# Patient Record
Sex: FEMALE | Race: WHITE | Employment: FULL TIME | URBAN - METROPOLITAN AREA
[De-identification: names, ages, dates, MRNs, and addresses within clinical notes are randomized per-mention and may not be internally consistent; named-entity substitution may affect disease eponyms.]

---

## 2017-05-20 ENCOUNTER — HOSPITAL ENCOUNTER (EMERGENCY)
Age: 37
Discharge: HOME | End: 2017-05-20
Attending: INTERNAL MEDICINE | Admitting: INTERNAL MEDICINE
Payer: COMMERCIAL

## 2017-05-20 ENCOUNTER — APPOINTMENT (OUTPATIENT)
Dept: GENERAL RADIOLOGY | Age: 37
End: 2017-05-20
Attending: INTERNAL MEDICINE
Payer: COMMERCIAL

## 2017-05-20 VITALS
DIASTOLIC BLOOD PRESSURE: 70 MMHG | TEMPERATURE: 98 F | SYSTOLIC BLOOD PRESSURE: 118 MMHG | OXYGEN SATURATION: 98 % | BODY MASS INDEX: 41.98 KG/M2 | HEIGHT: 68 IN | HEART RATE: 70 BPM | RESPIRATION RATE: 16 BRPM | WEIGHT: 277 LBS

## 2017-05-20 DIAGNOSIS — N30.00 ACUTE CYSTITIS WITHOUT HEMATURIA: ICD-10-CM

## 2017-05-20 DIAGNOSIS — M54.50 BILATERAL LOW BACK PAIN WITHOUT SCIATICA, UNSPECIFIED CHRONICITY: Primary | ICD-10-CM

## 2017-05-20 LAB
APPEARANCE UR: ABNORMAL
BACTERIA URNS QL MICRO: ABNORMAL /HPF
BILIRUB UR QL: NEGATIVE
COLOR UR: ABNORMAL
EPITH CASTS URNS QL MICRO: ABNORMAL /LPF (ref 0–5)
GLUCOSE UR STRIP.AUTO-MCNC: NEGATIVE MG/DL
HCG UR QL: NEGATIVE
HGB UR QL STRIP: NEGATIVE
KETONES UR QL STRIP.AUTO: ABNORMAL MG/DL
LEUKOCYTE ESTERASE UR QL STRIP.AUTO: ABNORMAL
NITRITE UR QL STRIP.AUTO: POSITIVE
PH UR STRIP: 5.5 [PH] (ref 5–8)
PROT UR STRIP-MCNC: NEGATIVE MG/DL
RBC #/AREA URNS HPF: ABNORMAL /HPF (ref 0–5)
SP GR UR REFRACTOMETRY: 1.02 (ref 1–1.03)
UROBILINOGEN UR QL STRIP.AUTO: 1 EU/DL (ref 0.2–1)
WBC URNS QL MICRO: ABNORMAL /HPF (ref 0–5)

## 2017-05-20 PROCEDURE — 87077 CULTURE AEROBIC IDENTIFY: CPT | Performed by: INTERNAL MEDICINE

## 2017-05-20 PROCEDURE — 87086 URINE CULTURE/COLONY COUNT: CPT | Performed by: INTERNAL MEDICINE

## 2017-05-20 PROCEDURE — 99284 EMERGENCY DEPT VISIT MOD MDM: CPT

## 2017-05-20 PROCEDURE — 72110 X-RAY EXAM L-2 SPINE 4/>VWS: CPT

## 2017-05-20 PROCEDURE — 74011250636 HC RX REV CODE- 250/636: Performed by: INTERNAL MEDICINE

## 2017-05-20 PROCEDURE — 96372 THER/PROPH/DIAG INJ SC/IM: CPT

## 2017-05-20 PROCEDURE — 87186 SC STD MICRODIL/AGAR DIL: CPT | Performed by: INTERNAL MEDICINE

## 2017-05-20 PROCEDURE — 81025 URINE PREGNANCY TEST: CPT

## 2017-05-20 PROCEDURE — 81001 URINALYSIS AUTO W/SCOPE: CPT | Performed by: INTERNAL MEDICINE

## 2017-05-20 RX ORDER — KETOROLAC TROMETHAMINE 30 MG/ML
30 INJECTION, SOLUTION INTRAMUSCULAR; INTRAVENOUS
Status: COMPLETED | OUTPATIENT
Start: 2017-05-20 | End: 2017-05-20

## 2017-05-20 RX ORDER — CEPHALEXIN 500 MG/1
500 CAPSULE ORAL 3 TIMES DAILY
Qty: 21 CAP | Refills: 0 | Status: SHIPPED | OUTPATIENT
Start: 2017-05-20 | End: 2017-05-27

## 2017-05-20 RX ORDER — ACETAMINOPHEN AND CODEINE PHOSPHATE 300; 30 MG/1; MG/1
1 TABLET ORAL
Qty: 12 TAB | Refills: 0 | Status: SHIPPED | OUTPATIENT
Start: 2017-05-20 | End: 2020-03-28

## 2017-05-20 RX ADMIN — KETOROLAC TROMETHAMINE 30 MG: 30 INJECTION, SOLUTION INTRAMUSCULAR at 12:35

## 2017-05-20 NOTE — ED PROVIDER NOTES
[address]  EMERGENCY DEPARTMENT HISTORY AND PHYSICAL EXAM       Date: 5/20/2017   Patient Name: Tanvir Aguilar   YOB: 1980  Medical Record Number: [id number]    History of Presenting Illness     Chief Complaint   Patient presents with    Back Pain        History Provided By:  patient    Additional History: 12:11 PM  Dada Crespo is a 42 y.o. female who presents to the emergency department from work C/O an intermittent sharp and pressure-like, lower back pain (8/10) x 2 weeks, which started while the pt was lifting a pan of chicken at work. The pain is worse in the middle of her back and radiates bilaterally to both buttocks. [facility]10/2017 and was rx'd muscle relaxers, tylenol #3, and ibuprofen. She returned to work on 5/15 and was still in significant pain. She was then sent to [facility], and had the muscle relaxer refilled, was rx'd prednisone, and was told to discontinue tylenol #3. Reports recent weight loss due to reduced PO. LNMP was 3 weeks ago. Pt denies fever, chills, bowel or bladder incontinence, dysuria, foul odor to urine other injury or fall, abdominal pain, CP, any other sxs or complaints at this time. Primary Care Provider: None   Specialist:    Past History     Past Medical History:   History reviewed. No pertinent past medical history. Past Surgical History:   History reviewed. No pertinent surgical history. Family History:   History reviewed. No pertinent family history. Social History:   Social History   Substance Use Topics    Smoking status: Never Smoker    Smokeless tobacco: None    Alcohol use Yes        Allergies:   No Known Allergies     Review of Systems   Review of Systems   Constitutional: Negative for chills and fever. Genitourinary: Negative for dysuria and hematuria. Musculoskeletal: Positive for back pain (lower) and myalgias (bilateral buttocks ).    All other systems reviewed and are negative. Physical Exam  Vitals:    05/20/17 1104 05/20/17 1240   BP: 135/87 121/74   Pulse: 85 73   Resp: 16 15   Temp: 98 °F (36.7 °C)    SpO2: 100% 98%   Weight: 125.6 kg (277 lb)    Height: 5' 8\" (1.727 m)        Physical Exam   Constitutional: She is oriented to person, place, and time. She appears well-developed and well-nourished. HENT:   Head: Normocephalic and atraumatic. Right Ear: External ear normal.   Left Ear: External ear normal.   Nose: Nose normal.   Mouth/Throat: Oropharynx is clear and moist.   Eyes: Conjunctivae and EOM are normal. Pupils are equal, round, and reactive to light. Right eye exhibits no discharge. Left eye exhibits no discharge. No scleral icterus. Neck: Normal range of motion. Neck supple. No JVD present. No tracheal deviation present. Cardiovascular: Normal rate, regular rhythm, normal heart sounds and intact distal pulses. Pulmonary/Chest: Effort normal and breath sounds normal.   Abdominal: Soft. Bowel sounds are normal. She exhibits no distension. There is no tenderness. No HSM   Musculoskeletal: Normal range of motion. Cervical back: She exhibits no tenderness. Thoracic back: She exhibits no tenderness. Lumbar back: She exhibits tenderness. She exhibits no spasm. L1-L5 midline tenderness and parspinal bilaterally  No thoracic or cerival paraspinal or midline tenderness  No spasms   No step-offs     Neurological: She is alert and oriented to person, place, and time. She has normal strength and normal reflexes. Reflex Scores:       Patellar reflexes are 2+ on the right side and 2+ on the left side. No focal motor weakness. Strength 5/5 proximal and distal    Skin: Skin is warm and dry. No rash noted. Psychiatric: She has a normal mood and affect. Her behavior is normal.   Nursing note and vitals reviewed.       Diagnostic Study Results     Labs -      Recent Results (from the past 12 hour(s))   URINALYSIS W/ RFLX MICROSCOPIC Collection Time: 05/20/17 11:15 AM   Result Value Ref Range    Color DARK YELLOW      Appearance CLOUDY      Specific gravity 1.023 1.005 - 1.030      pH (UA) 5.5 5.0 - 8.0      Protein NEGATIVE  NEG mg/dL    Glucose NEGATIVE  NEG mg/dL    Ketone TRACE (A) NEG mg/dL    Bilirubin NEGATIVE  NEG      Blood NEGATIVE  NEG      Urobilinogen 1.0 0.2 - 1.0 EU/dL    Nitrites POSITIVE (A) NEG      Leukocyte Esterase SMALL (A) NEG     URINE MICROSCOPIC ONLY    Collection Time: 05/20/17 11:15 AM   Result Value Ref Range    WBC 4 to 10 0 - 5 /hpf    RBC 0 to 3 0 - 5 /hpf    Epithelial cells 4+ 0 - 5 /lpf    Bacteria 4+ (A) NEG /hpf   HCG URINE, QL. - POC    Collection Time: 05/20/17 11:51 AM   Result Value Ref Range    Pregnancy test,urine (POC) NEGATIVE  NEG         Radiologic Studies -  The following have been ordered and reviewed:  XR SPINE LUMB MIN 4 V    (Results Pending)     2:04 PM  RADIOLOGY FINDINGS  Lumbar spine X-ray shows DJD L5-S1, no obvious fracture or dislocation  Pending review by Radiologist  Recorded by Tyesha Barfield ED Scribe, as dictated by Zafar Felipe MD    Medical Decision Making   I am the first provider for this patient. I reviewed the vital signs, available nursing notes, past medical history, past surgical history, family history and social history. DDx: Strain, sprain, disc herniation  R/O: Fracture, dislocation  DDx: UTI  R/O: Other infection, does not appear septic     Vital Signs-Reviewed the patient's vital signs. Patient Vitals for the past 12 hrs:   Temp Pulse Resp BP SpO2   05/20/17 1240 - 73 15 121/74 98 %   05/20/17 1104 98 °F (36.7 °C) 85 16 135/87 100 %       Pulse Oximetry Analysis - Normal 100% on Room Air      Procedures:   Procedures    ED Course:  12:11 PM   Initial assessment performed. The patients presenting problems have been discussed, and they are in agreement with the care plan formulated and outlined with them.   I have encouraged them to ask questions as they arise throughout their visit. PROGRESS NOTE:   2:08 PM  Feeling a lot better, and thankful for care. Pain is much improved, walked with normal gait without assistance. Informed her that MRI is not needed at this time, as this is not an emergent condition. Ready for discharge. Not toxic or septic appearing. Written by Estefany Chang, ED Scribe, as dictated by Neal Mccracken MD.    Medications Given in the ED:  Medications   ketorolac tromethamine (TORADOL) 60 mg/2 mL injection 30 mg (30 mg IntraMUSCular Given 5/20/17 8935)     Discharge Note:  2:11 PM  Pt has been reexamined. Patient has no new complaints, changes, or physical findings. Care plan outlined and precautions discussed. Results were reviewed with the patient. All medications were reviewed with the patient; will d/c home with Tylenol #3 and keflex. All of pt's questions and concerns were addressed. Patient was instructed and agrees to follow up with her PCP and Ortho, as well as to return to the ED upon further deterioration. Patient is ready to go home. Diagnosis     Clinical Impression:     1. Bilateral low back pain without sciatica, unspecified chronicity    2. Acute cystitis without hematuria           Follow-up Information     Follow up With Details Comments [facility], DO Call in 2 days Follow up with your primary care provider, or with the one listed above  [address] [facility]      Jaleesa Smith MD In 2 days Call Dr. Chanell Chopra for orthopedic follow-up  [facility]      [facility] EMERGENCY DEPT  As needed, If symptoms worsen [address] [address] [phone]          Current Discharge Medication List      START taking these medications    Details   acetaminophen-codeine (TYLENOL-CODEINE #3) 300-30 mg per tablet Take 1 Tab by mouth every four (4) hours as needed for Pain.  Max Daily Amount: 6 Tabs.  Qty: 12 Tab, Refills: 0      cephALEXin (KEFLEX) 500 mg capsule Take 1 Cap by mouth three (3) times daily for 7 days. Qty: 21 Cap, Refills: 0           _______________________________   Attestations: This note is prepared by Eileen Saldaña, acting as a Scribe for Jocelyne Hairston MD on 11:41 AM on 5/20/2017 . Jocelyne Hairston MD: The scribe's documentation has been prepared under my direction and personally reviewed by me in its entirety.   _______________________________

## 2017-05-20 NOTE — LETTER
[facility] [place] 
[facility] EMERGENCY DE[facility] 
[address] [address] 
[phone] Work/School Note Date: 5/20/2017 To Whom It May concern: 
 
Nupur Aguilar was seen and treated today in the emergency room by the following provider(s): 
Attending Provider: Rosa Isela Alvarez MD.   
 
Nupur Aguilar should be excused from work through Tuesday, May 23, 2017; Any further work restrictions or time off will be advised at follow up appointment this week. Sincerely, Rosa Isela Alvarez MD

## 2017-05-20 NOTE — DISCHARGE INSTRUCTIONS
Back Pain: Care Instructions  Your Care Instructions    Back pain has many possible causes. It is often related to problems with muscles and ligaments of the back. It may also be related to problems with the nerves, discs, or bones of the back. Moving, lifting, standing, sitting, or sleeping in an awkward way can strain the back. Sometimes you don't notice the injury until later. Arthritis is another common cause of back pain. Although it may hurt a lot, back pain usually improves on its own within several weeks. Most people recover in 12 weeks or less. Using good home treatment and being careful not to stress your back can help you feel better sooner. Follow-up care is a key part of your treatment and safety. Be sure to make and go to all appointments, and call your doctor if you are having problems. Its also a good idea to know your test results and keep a list of the medicines you take. How can you care for yourself at home? · Sit or lie in positions that are most comfortable and reduce your pain. Try one of these positions when you lie down:  ¨ Lie on your back with your knees bent and supported by large pillows. ¨ Lie on the floor with your legs on the seat of a sofa or chair. [place] on your side with your knees and hips bent and a pillow between your legs. ¨ Lie on your stomach if it does not make pain worse. · Do not sit up in bed, and avoid soft couches and twisted positions. Bed rest can help relieve pain at first, but it delays healing. Avoid bed rest after the first day of back pain. · Change positions every 30 minutes. If you must sit for long periods of time, take breaks from sitting. Get up and walk around, or lie in a comfortable position. · Try using a heating pad on a low or medium setting for 15 to 20 minutes every 2 or 3 hours. Try a warm shower in place of one session with the heating pad. · You can also try an ice pack for 10 to 15 minutes every 2 to 3 hours.  Put a thin cloth between the ice pack and your skin. · Take pain medicines exactly as directed. ¨ If the doctor gave you a prescription medicine for pain, take it as prescribed. ¨ If you are not taking a prescription pain medicine, ask your doctor if you can take an over-the-counter medicine. · Take short walks several times a day. You can start with 5 to 10 minutes, 3 or 4 times a day, and work up to longer walks. Walk on level surfaces and avoid hills and stairs until your back is better. · Return to work and other activities as soon as you can. Continued rest without activity is usually not good for your back. · To prevent future back pain, do exercises to stretch and strengthen your back and stomach. Learn how to use good posture, safe lifting techniques, and proper body mechanics. When should you call for help? Call your doctor now or seek immediate medical care if:  · You have new or worsening numbness in your legs. · You have new or worsening weakness in your legs. (This could make it hard to stand up.)  · You lose control of your bladder or bowels. Watch closely for changes in your health, and be sure to contact your doctor if:  · Your pain gets worse. · You are not getting better after 2 weeks. Where can you learn more? Go to http://amy-km.info/. Enter U630 in the search box to learn more about \"Back Pain: Care Instructions. \"  Current as of: May 23, 2016  Content Version: 11.2  © 6587-1782 [employer]. Care instructions adapted under license by [employer] (which disclaims liability or warranty for this information). If you have questions about a medical condition or this instruction, always ask your healthcare professional. [place] [address] any warranty or liability for your use of this information.        Urinary Tract Infection in Women: Care Instructions  Your Care Instructions    A urinary tract infection, or UTI, is a general term for an infection anywhere between the kidneys and the urethra (where urine comes out). Most UTIs are bladder infections. They often cause pain or burning when you urinate. UTIs are caused by bacteria and can be cured with antibiotics. Be sure to complete your treatment so that the infection goes away. Follow-up care is a key part of your treatment and safety. Be sure to make and go to all appointments, and call your doctor if you are having problems. It's also a good idea to know your test results and keep a list of the medicines you take. How can you care for yourself at home? · Take your antibiotics as directed. Do not stop taking them just because you feel better. You need to take the full course of antibiotics. · Drink extra water and other fluids for the next day or two. This may help wash out the bacteria that are causing the infection. (If you have kidney, heart, or liver disease and have to limit fluids, talk with your doctor before you increase your fluid intake.)  · Avoid drinks that are carbonated or have caffeine. They can irritate the bladder. · Urinate often. Try to empty your bladder each time. · To relieve pain, take a hot bath or lay a heating pad set on low over your lower belly or genital area. Never go to sleep with a heating pad in place. To prevent UTIs  · Drink plenty of water each day. This helps you urinate often, which clears bacteria from your system. (If you have kidney, heart, or liver disease and have to limit fluids, talk with your doctor before you increase your fluid intake.)  · Urinate when you need to. · Urinate right after you have sex. · Change sanitary pads often. · Avoid douches, bubble baths, feminine hygiene sprays, and other feminine hygiene products that have deodorants. · After going to the bathroom, wipe from front to back. When should you call for help?   Call your doctor now or seek immediate medical care if:  · Symptoms such as fever, chills, nausea, or vomiting get worse or appear for the first time. · You have new pain in your back just below your rib cage. This is called flank pain. · There is new blood or pus in your urine. · You have any problems with your antibiotic medicine. Watch closely for changes in your health, and be sure to contact your doctor if:  · You are not getting better after taking an antibiotic for 2 days. · Your symptoms go away but then come back. Where can you learn more? Go to http://amy-km.info/. Enter N010 in the search box to learn more about \"Urinary Tract Infection in Women: Care Instructions. \"  Current as of: November 28, 2016  Content Version: 11.2  © 0095-2806 [employer]. Care instructions adapted under license by [employer] (which disclaims liability or warranty for this information). If you have questions about a medical condition or this instruction, always ask your healthcare professional. [address] any warranty or liability for your use of this information.

## 2017-05-20 NOTE — ED TRIAGE NOTES
Pt reports work related injury on 5/8/17. 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 Pt reports she went to [facility] on 5/10/17 and was prescribed Tylenol 3, ibuprofen (800mg), and a muscle relaxer for pain and told to return on light duty on 5/15/17, full duty on 5/18/17. Pt unable to work on the 15th, states that \"the vibrations from the bus just getting to work were too much. \" [facility] 5/15/17 and given a refill of the muscle relaxer and told to stop taking the Tylenol 3 and start a steroid pack. Pt states her back pain is persistent and work sent her to the ED today.

## 2017-05-20 NOTE — ED NOTES
Pt resting in stretcher in NAD at this time. Urine collected. Call light within reach, no needs verbalized at this time.

## 2017-05-20 NOTE — ED NOTES
Pt resting in stretcher in NAD at this time. Updated on plan of care. Call light within reach. No needs verbalized at this time.

## 2017-05-22 LAB
BACTERIA SPEC CULT: ABNORMAL
SERVICE CMNT-IMP: ABNORMAL

## 2017-05-25 ENCOUNTER — HOSPITAL ENCOUNTER (OUTPATIENT)
Dept: PHYSICAL THERAPY | Age: 37
Discharge: HOME | End: 2017-05-25
Payer: COMMERCIAL

## 2017-05-25 PROCEDURE — 97014 ELECTRIC STIMULATION THERAPY: CPT

## 2017-05-25 PROCEDURE — 97530 THERAPEUTIC ACTIVITIES: CPT

## 2017-05-25 PROCEDURE — 97162 PT EVAL MOD COMPLEX 30 MIN: CPT

## 2017-05-25 NOTE — PROGRESS NOTES
[facility] in [facility]. [address], [address] [place]  Phone: [phone]      Fax:  [phone] of Care/ Statement of Necessity for Physical Therapy Services       Patient name: Veronica Copeland Start of Care: 2017   Referral source: Dontae Min MD : 1980    Medical Diagnosis: Strain of lumbar spine [S39.012A]  SI (sacroiliac) joint dysfunction [M53.3]   Onset Date: 17    Treatment Diagnosis: LS strain/SI dysfunction   Prior Hospitalization: see medical history Provider#: [id number]   Medications: Verified on Patient summary List    Comorbidities: depression, asthma, epilepsi   Prior Level of Function: full function      The Plan of Care and following information is based on the information from the initial evaluation. Assessment/ key information: 42 YOF s/p back strain at work on 17 resulting in LB, buttock and LE pain presenting with pain up to 8/10 and limited function. Objective findings include limited spinal mobility yessica into Ext, radicular symptoms , Flexion bias, postural asymmetries , mild deficit in core strength and use of fair body mechanics. Self reported FOTO score of 31/100 indicates exteme difficulty with performance of usual work or household activities. Patient is a good candidate for skilled PT. Evaluation Complexity History MEDIUM  Complexity : 1-2 comorbidities / personal factors will impact the outcome/ POC ; Examination MEDIUM Complexity : 3 Standardized tests and measures addressing body structure, function, activity limitation and / or participation in recreation  ;Presentation MEDIUM Complexity : Evolving with changing characteristics  ; Clinical Decision Making MEDIUM Complexity : FOTO score of 26-74  Overall Complexity Rating: MEDIUM  Problem List: decrease ROM, decrease strength, decrease activity tolerance and decrease flexibility/ joint mobility   Treatment Plan may include any combination of the following: Therapeutic exercise, Therapeutic activities, Neuromuscular re-education, Physical agent/modality, Manual therapy and Patient education  Patient / Family readiness to learn indicated by: asking questions and trying to perform skills  Persons(s) to be included in education: patient (P)  Barriers to Learning/Limitations: None  Patient Goal (s): Reduce pain, go back to normal work duty  Patient Self Reported Health Status: good  Rehabilitation Potential: good    Short Term Goals: To be accomplished in 2 weeks:   1. Reduction in LE symptoms by >or= 50% and centralization of pain to allow increased ease with ADL  Status at [facility]: LB, groin, buttock and LE pain    2. Patient reports reduction in pain to <or= 5/10 with ADL for increased ease with basic function  Status at [facility]: pain ranging up to 8/10    Long Term Goals: To be accomplished in 4 weeks:   1. Improved spinal mobility to >or= 80% of WNL for increased ease with dynamic activities including overhead reaching with spinal Ext and limited stooping  Status at [facility]: able to peform spinal Flex with feeling of tightness but unable to perform any spinal Ext due to sharp pain    2. Improved FOTO score to >or= 53/100 as evidence of improved function with housekeeping and work activities  Status at [place] [id number]    3. Patient able to perform material handing of >or= 30# including lifting, pulling, pushing to allow return to all work duties   Status at [facility]: limited tolerances, currently working with restrictions    4. Reduction in pain to <or = 3/10 with all ADL  Status at [facility]: pain up to 8/10 with limited activity    Frequency / Duration: Patient to be seen 2 times per week for 2 weeks after initial evaluation (9visits).     Patient/ Caregiver education and instruction: Diagnosis, prognosis, activity modification and exercises   [x]  Plan of care has been reviewed with DEYA Cifuentes, PT 5/25/2017 12:59 PM  _____________________________________________________________________  I certify that the above Therapy Services are being furnished while the patient is under my care. I agree with the treatment plan and certify that this therapy is necessary. Physician's Signature:____________________  Date:__________Time:______    Please sign and return to   [facility] in [address]  [facility], [facility] [address]  Phone: [phone]      Fax:  [phone]

## 2017-05-25 NOTE — PROGRESS NOTES
PT DAILY TREATMENT NOTE/LUMBAR EVAL 3-16    Patient Name: Melissa Glasgow  Date:2017  : 1980  [x]  Patient  Verified  Payor: [address] [place] [address] Road / Plan: [employer] / Product Type: Workers Comp /    In [employer] time:950  Total Treatment Time (min): 60  Total Timed Codes (min): 60  1:1 Treatment Time ( only): n/a   Visit #: 1 of 9    Treatment Area: Strain of lumbar spine [S39.012A]  SI (sacroiliac) joint dysfunction [M53.3]  SUBJECTIVE  Pain Level (0-10 scale): 8  [x]constant []intermittent []improving []worsening []no change since onset    Any medication changes, allergies to medications, adverse drug reactions, diagnosis change, or new procedure performed?: [x] No    [] Yes (see summary sheet for update)  Subjective functional status/changes:Patient reports onset of sharp LB, bilateral buttock pain and intermittent sharp shooting pain into right LE. Onset of pain after injury on 17 as she was lifting a pan of chicken with a twisted motion. She experienced sharp LBP at that time. At present working on restricted duty at [employer]. Also reporting 'feeling of vibration under both feet and occasional giving out of right leg\". She reports previous muscle spasms in both shoulders and upper back. After the injury she was off for 5 days. At present she reports increased pain with sitting, standing. She returned to her job on 5/15/17 but was unable to perform her duty due to pain.     PLOF: full function  And full duty work in [address] [place] [address]  Limitations to PLOF: limited in prolonged standing, lifting , sitting  Mechanism of Injury: faulty lifting mechanics, twisting with load  Current symptoms/Complaints: LB, bilateral buttock pain, sharp right LE and groin pain  Previous Treatment/Compliance: n/a  PMHx/Surgical Hx: n/a  Work Hx: as above  Living Situation: WNL  Pt Goals: reduction in pain  Barriers: []pain []financial []time []transportation []other  Motivation: good  Substance use: []Alcohol []Tobacco []other:   FABQ Score: []low []elevate  Cognition: A & O x 3    Other:    OBJECTIVE/EXAMINATION  Domestic Life: WNL  Activity/Recreational Limitations: limited   Mobility: limited  Self Care: limited spinal mobility        Modality rationale: Pain control   Min Type Additional Details   20 [x] Estim:  [x]Unatt       [x]IFC  []Premod                        []Other:  []w/ice   []w/heat  Position:90/90  Location:LB    [] Estim: []Att    []TENS instruct  []NMES                    []Other:  []w/US   []w/ice   []w/heat  Position:  Location:    []  Traction: [] Cervical       []Lumbar                       [] Prone          []Supine                       []Intermittent   []Continuous Lbs:  [] before manual  [] after manual    []  Ultrasound: []Continuous   [] Pulsed                           []1MHz   []3MHz Location:  W/cm2:    []  Iontophoresis with dexamethasone         Location: [] Take home patch   [] In clinic    []  Ice     []  heat  []  Ice massage  []  Laser   []  Anodyne Position:  Location:    []  Laser with stim  []  Other: Position:  Location:    []  Vasopneumatic Device Pressure:       [] lo [] med [] hi   Temperature: [] lo [] med [] hi   [] Skin assessment post-treatment:  []intact []redness- no adverse reaction    []redness - adverse reaction:     15 min [x]Eval                  []Re-Eval           25 min Therapeutic Activity:  [x]  See flow sheet :instruction in HEP, POC, initiation of MARLENA and bio for improved LS stability   Rationale: increase ROM and increase strength  to improve the patients ability to return to PLOF                   With   [] TE   [] TA   [] neuro   [] other: Patient Education: [x] Review HEP    [] Progressed/Changed HEP based on:   [] positioning   [] body mechanics   [] transfers   [] heat/ice application    [] other:      Other Objective/Functional Measures: as per evaluation    Physical Therapy Evaluation - Lumbar Spine ([facility])    SUBJECTIVE  Chief Complaint:LB, bilateral buttock/LE pain    Mechanism of injury:lift/twist of load    Symptoms:  Aggravated by:   [x] Bending [x] Sitting [x] Standing [] Walking   [] Moving [] Cough [] Sneeze [] Valsalva   [x] AM  [x] PM  Lying:  [] sup   [] pro   [] sidelying   [] Other:     Eased by:    [] Bending [] Sitting [] Standing [] Walking   [] Moving [] AM  [] PM  Lying: [] sup  [] pro  [] sidelying   [x] Other:rest, MH, positional change     General Health:  Red Flags Indicated? [] Yes    [] No  [] Yes [] No Recent weight change (If yes, due to dieting?  [] Yes  [] No)   [] Yes [] No Weakness in legs during walking  [] Yes [] No Unremitting pain at night  [] Yes [] No Abdominal pain or problems  [] Yes [] No Rectal bleeding  [] Yes [] No Feet more cold or painful in cold weather  [] Yes [] No Menstrual irregularities  [] Yes [] No Blood or pain with urination  [] Yes [] No Dysfunction of bowel or bladder  [] Yes [] No Recent illness within past 3 weeks (i.e, cold, flu)  [] Yes [] No Numbness/tingling in buttock/genitalia region    Past History/Treatments: n/a    Diagnostic Tests: [] Lab work [] X-rays    [] CT [] MRI     [] Other:  Results:    Functional Status  Prior level of function:full function   Present functional limitations:restricted duty  What position do you sleep in?:    OBJECTIVE  Posture:  Lateral Shift: [] R    [] L     [x] +  [] -  Kyphosis: [] Increased [] Decreased   []  WNL  Lordosis:  [x] Increased [] Decreased   [] WNL  Pelvic symmetry: [] WNL    [x] Other:left LE appears shorter in supine    Gait:  [] Normal     [x] Abnormal:mild guarding  Active Movements: [] N/A   [] Too acute   [] Other:  ROM % AROM % PROM Comments:pain, area   Forward flexion 40-60 FFD 8\"  LB pulling   Extension 20-30 unable  Sharp pain   SB right 20-30 19\"  pain   SB left 20-30 20\"  pain   Rotation right 5-10 limited     Rotation left 5-10 limited  Sharp LBP     Repeated Movements   Effects on present pain: produces (NJ), abolishes (A), increases (incr), decreases (decr), centralizes (C), peripheral (PH), no effect (NE)   Pre-Test Sx Flexion Repeated Flexion Extension Repeated Extension Repeated SBL Repeated SBR   Sitting          Standing          Lying      N/A N/A   Comments:  Side Glide:  Sustained passive positioning test:    Neuro Screen [x] WNL  Myotome/Dermatome/Reflexes:  Comments:    Dural Mobility:  SLR Sitting: [] R    [] L    [] +    [] -  @ (degrees):           Supine: [] R    [] L    [] +    [] -  @ (degrees):   Slump Test: [] R    [] L    [] +    [] -  @ (degrees):   Prone Knee Bend: [] R    [] L    [] +    [] -     Palpation  [] Min  [x] Mod  [] Severe    Location:Both SI, L5/S1, right >left piriformis  [] Min  [] Mod  [] Severe    Location:  [] Min  [] Mod  [] Severe    Location:    Strength: gross strength WNL   L(0-5) R (0-5) N/T   Hip Flexion (L1,2)   []   Knee Extension (L3,4)   []   Ankle Dorsiflexion (L4)   []   Great Toe Extension (L5)   []   Ankle Plantarflexion (S1)   []   Knee Flexion (S1,2)   []   Upper Abdominals 4 4 []   Lower Abdominals 4 4 []   Paraspinals   []   Back Rotators   []   Gluteus Rasta   []   Other   []     Special Tests  Lumbar:  Lumb.  Compression: [] Pos  [] Neg               Lumbar Distraction:   [] Pos  [] Neg    Quadrant:  [] Pos  [] Neg   [] Flex  [] Ext    Sacroilliac:  Gaenslen's: [] R    [] L    [] +    [] -     Compression: [] +    [] -     Gapping:  [] +    [] -     Thigh Thrust: [] R    [] L    [] +    [] -     Leg Length: [] +    [] -   Position:    Crests:    ASIS:    PSIS:    Sacral Sulcus:    Mobility: Standing flex:     Sitting flex:     Supine to sit:     Prone knee bend:         Hip: Hermon Riches:  [] R    [] L    [] +    [] -     Scour:  [] R    [] L    [] +    [] -     Piriformis: [x] R    [x] L    [x] +    [] -          Deficits: Guera's: [] R    [] L    [] +    [] -     Jean: [x] R    [x] L    [x] +    [] -     Hamstrings 90/90:    Gastrocsoleus (to neutral): Right: Left:       Global Muscular Weakness:  Abdominals:mild strength deficit  Quadratus Lumborum:  Paraspinals:tight  Other:    Other tests/comments:       Pain Level (0-10 scale) post treatment: 0/10 in supine and standing    ASSESSMENT/Changes in Function: responding well to therapy    Patient will continue to benefit from skilled PT services to address ROM deficits, address strength deficits and analyze and address soft tissue restrictions to attain remaining goals.      [x]  See Plan of Care  []  See progress note/recertification  []  See Discharge Summary         Progress towards goals / Updated goals:  Reduction in pain    PLAN  []  Upgrade activities as tolerated     [x]  Continue plan of care  []  Update interventions per flow sheet       []  Discharge due to:_  []  Other:_      Rachel Drew, PT 5/25/2017  12:59 PM

## 2017-05-26 ENCOUNTER — HOSPITAL ENCOUNTER (OUTPATIENT)
Dept: PHYSICAL THERAPY | Age: 37
Discharge: HOME | End: 2017-05-26
Payer: COMMERCIAL

## 2017-05-26 PROCEDURE — 97110 THERAPEUTIC EXERCISES: CPT

## 2017-05-26 PROCEDURE — 97014 ELECTRIC STIMULATION THERAPY: CPT

## 2017-05-26 PROCEDURE — 97112 NEUROMUSCULAR REEDUCATION: CPT

## 2017-05-26 NOTE — PROGRESS NOTES
PT DAILY TREATMENT NOTE 12    Patient Name: Akira Fairchild  Date:2017  : 1980  [x]  Patient  Verified  Payor: [address] [place] [address] Road / Plan: [facility] [employer] / Product Type: Workers Comp /    In [employer] time:2  Total Treatment Time (min): 48  Visit #: 2 of 9    Treatment Area: Strain of lumbar spine [S39.012A]  SI (sacroiliac) joint dysfunction [M53.3]    SUBJECTIVE  Pain Level (0-10 scale):  2  Any medication changes, allergies to medications, adverse drug reactions, diagnosis change, or new procedure performed?: [x] No    [] Yes (see summary sheet for update)  Subjective functional status/changes:   [] No changes reported  \"The pain is not that bad any more\"    OBJECTIVE    Modality rationale: decrease pain to improve the patients ability to return to PLOF   Min Type Additional Details   15 [x] Estim:  [x]Unatt       [x]IFC  []Premod                        []Other:  []w/ice   []w/heat  Position:  Location:LB    [] Estim: []Att    []TENS instruct  []NMES                    []Other:  []w/US   []w/ice   []w/heat  Position:  Location:    []  Traction: [] Cervical       []Lumbar                       [] Prone          []Supine                       []Intermittent   []Continuous Lbs:  [] before manual  [] after manual    []  Ultrasound: []Continuous   [] Pulsed                           []1MHz   []3MHz W/cm2:  Location:    []  Iontophoresis with dexamethasone         Location: [] Take home patch   [] In clinic    []  Ice     []  heat  []  Ice massage  []  Laser   []  Anodyne Position:  Location:    []  Laser with stim  []  Other:  Position:  Location:    []  Vasopneumatic Device Pressure:       [] lo [] med [] hi   Temperature: [] lo [] med [] hi   [] Skin assessment post-treatment:  []intact []redness- no adverse reaction    []redness - adverse reaction:      min []Eval                  []Re-Eval       15 min Therapeutic Exercise:  [x] See flow sheet :stretching   Rationale: increase ROM and increase strength to improve the patients ability to return to PLOF     min Therapeutic Activity:  [x]  See flow sheet :        18 min Neuromuscular Re-education:  [x]  See flow sheet :MARLENA for repositioning, proper breathing pattern   Rationale: increase ROM and increase strength  to improve the patients ability to return to full function     min Manual Therapy:                   With   [] TE   [] TA   [] neuro   [] other: Patient Education: [x] Review HEP    [] Progressed/Changed HEP based on:   [] positioning   [] body mechanics   [] transfers   [] heat/ice application    [] other:      Other Objective/Functional Measures: patient started work today 3-4 hrs     Pain Level (0-10 scale) post treatment: 0/10 supine    ASSESSMENT/Changes in Function: reduction in pain, no increased pain with TE    Patient will continue to benefit from skilled PT services to address ROM deficits, address strength deficits, analyze and address soft tissue restrictions and assess and modify postural abnormalities to attain remaining goals. [x]  See Plan of Care  []  See progress note/recertification  []  See Discharge Summary         Progress towards goals / Updated goals:  Short Term Goals: To be accomplished in 2 weeks:   1. Reduction in LE symptoms by >or= 50% and centralization of pain to allow increased ease with ADL  Status at Eval: LB, groin, buttock and LE pain    2. Patient reports reduction in pain to <or= 5/10 with ADL for increased ease with basic function  Status at Eval: pain ranging up to 8/10    Long Term Goals: To be accomplished in 4 weeks:   1. Improved spinal mobility to >or= 80% of WNL for increased ease with dynamic activities including overhead reaching with spinal Ext and limited stooping  Status at Eval: able to peform spinal Flex with feeling of tightness but unable to perform any spinal Ext due to sharp pain    2.  Improved FOTO score to >or= 53/100 as evidence of improved function with housekeeping and work activities  Status at [facility]: FOTO     3. Patient able to perform material handing of >or= 30# including lifting, pulling, pushing to allow return to all work duties   Status at [employer]: limited tolerances, currently working with restrictions    4. Reduction in pain to <or = 3/10 with all ADL  Status at [facility]: pain up to 8/10 with limited activity  PLAN  []  Upgrade activities as tolerated     [x]  Continue plan of care  []  Update interventions per flow sheet       []  Discharge due to:_  []  Other:_      Mora Villalta, PT 2017  1:24 PM    Future Appointments  Date Time Provider Cricket Garvey   2017 4:00 PM Mora Villalta, [place] [facility]   2017 1:00 PM Brittany Gómez, PT [facility]   2017 3:00 PM Aramis Escalona, PT [facility]   2017 8:30 AM [facility]   2017 11:00 AM Mora Villalta, PT [facility]   2017 1:30 PM Brittany Gómez, PT [facility]   2017 8:30 AM Mora Villalta, PT [facility]         PT DAILY TREATMENT NOTE 12-16    Patient Name: Christopher Olson  Date:2017  : 1980  [x]  Patient  Verified  Payor: [address] [place] [address] Road / Plan: Jana Pan / Product Type: Workers Comp /    In Mahin Neel time:2  Total Treatment Time (min): 48  Visit #: 2 of 9    Treatment Area: Strain of lumbar spine [S39.012A]  SI (sacroiliac) joint dysfunction [M53.3]    SUBJECTIVE  Pain Level (0-10 scale):  2  Any medication changes, allergies to medications, adverse drug reactions, diagnosis change, or new procedure performed?: [x] No    [] Yes (see summary sheet for update)  Subjective functional status/changes:   [] No changes reported  Feeling better but still having pain in central LB and both buttock regions, non compliance with HEP due to 's hospitalization    OBJECTIVE    Modality rationale: Pain control   Min Type Additional Details   15 [x] Estim:  []Unatt       [x]IFC  []Premod                        []Other:  []w/ice [x]w/heat  Position:90/90  Location:LB    [] Estim: []Att    []TENS instruct  []NMES                    []Other:  []w/US   []w/ice   []w/heat  Position:  Location:    []  Traction: [] Cervical       []Lumbar                       [] Prone          []Supine                       []Intermittent   []Continuous Lbs:  [] before manual  [] after manual    []  Ultrasound: []Continuous   [] Pulsed                           []1MHz   []3MHz W/cm2:  Location:    []  Iontophoresis with dexamethasone         Location: [] Take home patch   [] In clinic    []  Ice     []  heat  []  Ice massage  []  Laser   []  Anodyne Position:  Location:    []  Laser with stim  []  Other:  Position:  Location:    []  Vasopneumatic Device Pressure:       [] lo [] med [] hi   Temperature: [] lo [] med [] hi   [] Skin assessment post-treatment:  []intact []redness- no adverse reaction    []redness - adverse reaction:      min []Eval                  []Re-Eval       18 min Therapeutic Exercise:  [x] See flow sheet :stretching, neutral spine   Rationale: increase ROM and increase strength to improve the patients ability to return to PLOF     min Therapeutic Activity:  []  See flow sheet :        15 min Neuromuscular Re-education:  [x]  See flow sheet :bio ex for LS stability, MARLENA ex for ES inhibition and improved ability for self correction during ADL   Rationale: increase ROM, increase strength, improve coordination and increase proprioception  to improve the patients ability to return to PLOF     min Manual Therapy:                   With   [] TE   [] TA   [] neuro   [] other: Patient Education: [x] Review HEP    [] Progressed/Changed HEP based on:   [] positioning   [] body mechanics   [] transfers   [] heat/ice application    [] other:      Other Objective/Functional Measures: minimal pain with current activities     Pain Level (0-10 scale) post treatment: 0/10 supine    ASSESSMENT/Changes in Function: reduction in pain    Patient will continue to benefit from skilled PT services to address functional mobility deficits, address ROM deficits, address strength deficits, analyze and address soft tissue restrictions, analyze and cue movement patterns and analyze and modify body mechanics/ergonomics to attain remaining goals. [x]  See Plan of Care  []  See progress note/recertification  []  See Discharge Summary         Progress towards goals / Updated goals:  Short Term Goals: To be accomplished in 2 weeks:   1. Reduction in LE symptoms by >or= 50% and centralization of pain to allow increased ease with ADL  Status at [facility]: LB, groin, buttock and LE pain    2. Patient reports reduction in pain to <or= 5/10 with ADL for increased ease with basic function  Status at [facility]: pain ranging up to 8/10    Long Term Goals: To be accomplished in 4 weeks:   1. Improved spinal mobility to >or= 80% of WNL for increased ease with dynamic activities including overhead reaching with spinal Ext and limited stooping  Status at [facility]: able to peform spinal Flex with feeling of tightness but unable to perform any spinal Ext due to sharp pain    2. Improved FOTO score to >or= 53/100 as evidence of improved function with housekeeping and work activities  Status at [place] [address]    3. Patient able to perform material handing of >or= 30# including lifting, pulling, pushing to allow return to all work duties   Status at [facility]: limited tolerances, currently working with restrictions    4.  Reduction in pain to <or = 3/10 with all ADL  Status at [facility]: pain up to 8/10 with limited activity    PLAN  []  Upgrade activities as tolerated     []  Continue plan of care  []  Update interventions per flow sheet       []  Discharge due to:_  []  Other:_      Audrey Augustin PT 5/26/2017  1:24 PM    Future Appointments  Date Time Provider Cricket Garvey   5/31/2017 4:00 PM Cheyanne Feliciano [facility]   6/1/2017 1:00 PM Brittany Rose, PT [facility]   6/5/2017 3:00 PM Merle Broussard PT [facility]ACOMO [facility]   6/7/2017 8:30 AM YAMILET Zhao [facility]   6/9/2017 11:00 AM YAMILET Zhao [facility]   6/12/2017 1:30 PM YAMILET Zhao [facility]   6/14/2017 8:30 AM YAMILET Zhao [facility]

## 2017-05-31 ENCOUNTER — HOSPITAL ENCOUNTER (OUTPATIENT)
Dept: PHYSICAL THERAPY | Age: 37
Discharge: HOME | End: 2017-05-31
Payer: COMMERCIAL

## 2017-05-31 PROCEDURE — 97140 MANUAL THERAPY 1/> REGIONS: CPT

## 2017-05-31 PROCEDURE — 97112 NEUROMUSCULAR REEDUCATION: CPT

## 2017-05-31 PROCEDURE — 97014 ELECTRIC STIMULATION THERAPY: CPT

## 2017-05-31 PROCEDURE — 97110 THERAPEUTIC EXERCISES: CPT

## 2017-05-31 NOTE — PROGRESS NOTES
PT DAILY TREATMENT NOTE     Patient Name: July Aguilar  Date:2017  : 1980  [x]  Patient  Verified  Payor: [facility] / Plan: Brenda De Leon / Product Type: Workers Comp /    In time:4  Out time:455  Total Treatment Time (min): 54  Visit #: 3 of 9    Treatment Area: Strain of lumbar spine [S39.012A]  SI (sacroiliac) joint dysfunction [M53.3]    SUBJECTIVE  Pain Level (0-10 scale): 5  Any medication changes, allergies to medications, adverse drug reactions, diagnosis change, or new procedure performed?: [x] No    [] Yes (see summary sheet for update)  Subjective functional status/changes:   [] No changes reported  \"Most of the pain I have at work. Occasional sharp pain into my right LE.  \"    OBJECTIVE    Modality rationale: decrease pain to improve the patients ability to return to PLOF   Min Type Additional Details   15 [x] Estim:  [x]Unatt       [x]IFC  []Premod                        []Other:  []w/ice   []w/heat  Position:/90  Location:LB    [] Estim: []Att    []TENS instruct  []NMES                    []Other:  []w/US   []w/ice   []w/heat  Position:  Location:    []  Traction: [] Cervical       []Lumbar                       [] Prone          []Supine                       []Intermittent   []Continuous Lbs:  [] before manual  [] after manual    []  Ultrasound: []Continuous   [] Pulsed                           []1MHz   []3MHz W/cm2:  Location:    []  Iontophoresis with dexamethasone         Location: [] Take home patch   [] In clinic    []  Ice     []  heat  []  Ice massage  []  Laser   []  Anodyne Position:  Location:    []  Laser with stim  []  Other:  Position:  Location:    []  Vasopneumatic Device Pressure:       [] lo [] med [] hi   Temperature: [] lo [] med [] hi   [] Skin assessment post-treatment:  []intact []redness- no adverse reaction    []redness - adverse reaction:      min []Eval                  []Re-Eval       15 min Therapeutic Exercise:  [x] See flow sheet :stretching Rationale: increase ROM and increase strength to improve the patients ability to return to PLOF     min Therapeutic Activity:  [x]  See flow sheet :        15 min Neuromuscular Re-education:  [x]  See flow sheet :MARLENA for repositioning and paraspinal  Inhibition, bio for LS stability   Rationale: increase ROM and increase strength  to improve the patients ability to return to full function    10 min Manual Therapy: MET for SI alignment                  With   [] TE   [] TA   [] neuro   [] other: Patient Education: [x] Review HEP    [] Progressed/Changed HEP based on:   [] positioning   [] body mechanics   [] transfers   [] heat/ice application    [] other:      Other Objective/Functional Measures:  average pain up to 3/10  Supine LLD, right LE longer- improved after MARLENA and MET     Pain Level (0-10 scale) post treatment: 0/10 supine    ASSESSMENT/Changes in Function: reduction in pain, no increased pain with TE    Patient will continue to benefit from skilled PT services to address ROM deficits, address strength deficits, analyze and address soft tissue restrictions and assess and modify postural abnormalities to attain remaining goals. [x]  See Plan of Care  []  See progress note/recertification  []  See Discharge Summary         Progress towards goals / Updated goals:  Short Term Goals: To be accomplished in 2 weeks:   1. Reduction in LE symptoms by >or= 50% and centralization of pain to allow increased ease with ADL  Status at Eval: LB, groin, buttock and LE pain  Current status: reduction in pain by 25%, progressing    2. Patient reports reduction in pain to <or= 5/10 with ADL for increased ease with basic function  Status at Eval: pain ranging up to 8/10  Current status: pain reduced to 0-3/10, goal met    Long Term Goals: To be accomplished in 4 weeks:   1.  Improved spinal mobility to >or= 80% of WNL for increased ease with dynamic activities including overhead reaching with spinal Ext and limited stooping  Status at [facility]: able to peform spinal Flex with feeling of tightness but unable to perform any spinal Ext due to sharp pain  Current status: improved spinal Ext, still restricted, progressing    2. Improved FOTO score to >or= 53/100 as evidence of improved function with housekeeping and work activities  Status at [place] [address]    3.  Patient able to perform material handing of >or= 30# including lifting, pulling, pushing to allow return to all work duties   Status at [employer]: limited tolerances, currently working with restrictions  Current status: able to perform box lift with good mechanics up to 20# without pain, min discomfort with 30# lift, progressing    4. Reduction in pain to <or = 3/10 with all ADL  Status at [facility]: pain up to 8/10 with limited activity  Current status: pain up to 3/10, still limited in material handling, overhead reaching, progressing  PLAN  []  Upgrade activities as tolerated     [x]  Continue plan of care  []  Update interventions per flow sheet       []  Discharge due to:_  []  Other:_      Tresa Salazar PT 5/31/2017  1:24 PM    Future Appointments  Date Time Provider Cricket Garvey   6/1/2017 10:00 AM Tresa Salazar PT [facility]   6/5/2017 3:00 PM Simona Khanna, PT [facility]   6/7/2017 8:30 AM Tresa Salazar PT [facility]   6/9/2017 11:00 AM Tresa Salazar PT [facility]   6/12/2017 1:30 PM Tresa Salazar PT [facility]   6/13/2017 2:30 PM Brittany Cordoba, YAMILET [facility]

## 2017-06-01 ENCOUNTER — HOSPITAL ENCOUNTER (OUTPATIENT)
Dept: PHYSICAL THERAPY | Age: 37
Discharge: HOME | End: 2017-06-01
Payer: COMMERCIAL

## 2017-06-01 PROCEDURE — 97140 MANUAL THERAPY 1/> REGIONS: CPT

## 2017-06-01 PROCEDURE — 97014 ELECTRIC STIMULATION THERAPY: CPT

## 2017-06-01 PROCEDURE — 97530 THERAPEUTIC ACTIVITIES: CPT

## 2017-06-01 NOTE — PROGRESS NOTES
PT DAILY TREATMENT NOTE     Patient Name: Danae Lopes  [id number]  : 1980  [x]  Patient  Verified  Payor: Prabhjot Yani / Plan: Carol Peeling / Product Type: Workers Comp /    In T3 Search time:11  Total Treatment Time (min): 55  Visit #: 4 of 9    Treatment Area: Strain of lumbar spine [S39.012A]  SI (sacroiliac) joint dysfunction [M53.3]    SUBJECTIVE  Pain Level (0-10 scale): 0  Any medication changes, allergies to medications, adverse drug reactions, diagnosis change, or new procedure performed?: [x] No    [] Yes (see summary sheet for update)  Subjective functional status/changes:   [] No changes reported  I walked here this AM. No pain.   \"    OBJECTIVE    Modality rationale: decrease pain to improve the patients ability to return to PLOF   Min Type Additional Details   15 [x] Estim:  [x]Unatt       [x]IFC  []Premod                        []Other:  []w/ice   []w/heat  Position:  Location:LB    [] Estim: []Att    []TENS instruct  []NMES                    []Other:  []w/US   []w/ice   []w/heat  Position:  Location:    []  Traction: [] Cervical       []Lumbar                       [] Prone          []Supine                       []Intermittent   []Continuous Lbs:  [] before manual  [] after manual    []  Ultrasound: []Continuous   [] Pulsed                           []1MHz   []3MHz W/cm2:  Location:    []  Iontophoresis with dexamethasone         Location: [] Take home patch   [] In clinic    []  Ice     []  heat  []  Ice massage  []  Laser   []  Anodyne Position:  Location:    []  Laser with stim  []  Other:  Position:  Location:    []  Vasopneumatic Device Pressure:       [] lo [] med [] hi   Temperature: [] lo [] med [] hi   [] Skin assessment post-treatment:  []intact []redness- no adverse reaction    []redness - adverse reaction:      min []Eval                  []Re-Eval        min Therapeutic Exercise:  [x] See flow sheet :stretching   Rationale: increase ROM and increase strength to improve the patients ability to return to PLOF    30 min Therapeutic Activity:  [x]  See flow sheet :advanced core activities and work simulation activities   Rationale: improve tolerance to ADL and work related activities      min Neuromuscular Re-education:  [x]  See flow sheet :       10 min Manual Therapy: STM right QL, functional massage   Rationale: reduction in LBP for increased tolerances              With   [] TE   [] TA   [] neuro   [] other: Patient Education: [x] Review HEP    [] Progressed/Changed HEP based on:   [] positioning   [] body mechanics   [] transfers   [] heat/ice application    [] other:      Other Objective/Functional Measures:  Mild LBP after several reps of ball walk out due to fatigue and insufficient LS stability  No pain with material handling       Pain Level (0-10 scale) post treatment: 0/10 supine    ASSESSMENT/Changes in Function: reduction in pain, no increased pain with TE    Patient will continue to benefit from skilled PT services to address ROM deficits, address strength deficits, analyze and address soft tissue restrictions and assess and modify postural abnormalities to attain remaining goals. [x]  See Plan of Care  []  See progress note/recertification  []  See Discharge Summary         Progress towards goals / Updated goals:  Short Term Goals: To be accomplished in 2 weeks:   1. Reduction in LE symptoms by >or= 50% and centralization of pain to allow increased ease with ADL  Status at Eval: LB, groin, buttock and LE pain  Current status: reduction in pain by 25%, progressing    2. Patient reports reduction in pain to <or= 5/10 with ADL for increased ease with basic function  Status at Eval: pain ranging up to 8/10  Current status: pain reduced to 0-3/10, goal met    Long Term Goals: To be accomplished in 4 weeks:   1.  Improved spinal mobility to >or= 80% of WNL for increased ease with dynamic activities including overhead reaching with spinal Ext and limited stooping  Status at [facility]: able to peform spinal Flex with feeling of tightness but unable to perform any spinal Ext due to sharp pain  Current status: improved spinal Ext, still restricted, progressing    2. Improved FOTO score to >or= 53/100 as evidence of improved function with housekeeping and work activities  Status at [place] [address]    3. Patient able to perform material handing of >or= 30# including lifting, pulling, pushing to allow return to all work duties   Status at [employer]: limited tolerances, currently working with restrictions  Current status: able to perform box lift with good mechanics up to 30# without pain, goal met    4.  Reduction in pain to <or = 3/10 with all ADL  Status at [facility]: pain up to 8/10 with limited activity  Current status: pain up to 3/10, still limited in material handling, overhead reaching, progressing  PLAN  []  Upgrade activities as tolerated     [x]  Continue plan of care, focus on core strength and LS stability  []  Update interventions per flow sheet       []  Discharge due to:_  []  Other:_      Michelene Councilman, YAMILET 6/1/2017  1:24 PM    Future Appointments  Date Time Provider Cricket Garvey   6/5/2017 3:00 PM Manny Lopez, PT [facility]   6/7/2017 8:30 AM Britatny Roy, PT [facility]   6/9/2017 11:00 AM Michelene Councilman, PT [facility]   6/12/2017 1:30 PM Michelene Councilman, PT [facility]   6/13/2017 2:30 PM YAMILET Leija [facility]

## 2017-06-05 ENCOUNTER — HOSPITAL ENCOUNTER (OUTPATIENT)
Dept: PHYSICAL THERAPY | Age: 37
Discharge: HOME | End: 2017-06-05
Payer: COMMERCIAL

## 2017-06-05 PROCEDURE — 97110 THERAPEUTIC EXERCISES: CPT

## 2017-06-05 PROCEDURE — 97530 THERAPEUTIC ACTIVITIES: CPT

## 2017-06-05 NOTE — PROGRESS NOTES
PT DAILY TREATMENT NOTE     Patient Name: Nedra Espino  [id number]  : 1980  [x]  Patient  Verified  Payor: [address] [place] [address] Road / Plan: Anup Laureano / Product Type: Workers Comp /    In time:3:00  Out time:4:00  Total Treatment Time (min): 60  Visit #: 5 of 9    Treatment Area: Strain of lumbar spine [S39.012A]  SI (sacroiliac) joint dysfunction [M53.3]    SUBJECTIVE  Pain Level (0-10 scale): 5/10  Any medication changes, allergies to medications, adverse drug reactions, diagnosis change, or new procedure performed?: [x] No    [] Yes (see summary sheet for update)  Subjective functional status/changes:   [] No changes reported  \"Pain 5/10 at worst since last treatment. I'm working in [employer] and feel better if I can keep moving compared to staying still. \"    OBJECTIVE    Modality rationale:    Min Type Additional Details    [] Estim:  []Unatt       []IFC  []Premod                        []Other:  []w/ice   []w/heat  Position:  Location:    [] Estim: []Att    []TENS instruct  []NMES                    []Other:  []w/US   []w/ice   []w/heat  Position:  Location:    []  Traction: [] Cervical       []Lumbar                       [] Prone          []Supine                       []Intermittent   []Continuous Lbs:  [] before manual  [] after manual    []  Ultrasound: []Continuous   [] Pulsed                           []1MHz   []3MHz W/cm2:  Location:    []  Iontophoresis with dexamethasone         Location: [] Take home patch   [] In clinic    []  Ice     []  heat  []  Ice massage  []  Laser   []  Anodyne Position:  Location:    []  Laser with stim  []  Other:  Position:  Location:    []  Vasopneumatic Device Pressure:       [] lo [] med [] hi   Temperature: [] lo [] med [] hi   [] Skin assessment post-treatment:  []intact []redness- no adverse reaction    []redness - adverse reaction:      min []Eval                  []Re-Eval       15 min Therapeutic Exercise:  [x] See flow sheet :  discharged flexion bias directional preference exercises, added REIL and ANTONIO at counter (to be performed every 1 hour while awake)   Rationale: decrease pain and radiculopathy to improve the patients ability to tolerate positions and ADLs. 45 min Therapeutic Activity:  []  See flow sheet :  Mechanical spine assessment and discussed modification of positions and spine mechanics   Rationale: decrease pain and radiculopathy to improve the patients ability to tolerate positions and ADLs. min Neuromuscular Re-education:  []  See flow sheet :        min Manual Therapy:          min Gait Training:  ___ feet with ___ device on level surfaces with ___ level of assist   Rationale: With   [] TE   [] TA   [] neuro   [] other: Patient Education: [x] Review HEP    [] Progressed/Changed HEP based on:   [] positioning   [] body mechanics   [] transfers   [] heat/ice application    [] other:      Other Objective/Functional Measures:   Innominates aligned. Sitting: midline LBP 1/10  Standing: increased LBP (4/10)  ANTONIO x10 reps: abolished LBP (0/10)  Prone x1 minutes: increased LBP (6/10)  LUKASZ x1 minutes: NE (6/10)  REIL x10 reps: abolished LBP (0/10)  ANTONIO at counter: NE (0/10)    Pain Level (0-10 scale) post treatment: 3/10    ASSESSMENT/Changes in Function:    Mechanical spine assessment shows a change in directional preference to extension bias. Pt abolished LBP perform REIL and ANTONIO at counter. Pt to hold flexion bias movements and postures and emphasize extension and neutral postures. Patient will continue to benefit from skilled PT services to address ROM deficits, address strength deficits, analyze and address soft tissue restrictions and assess and modify postural abnormalities to attain remaining goals.      [x] See Plan of Care  [] See progress note/recertification  [] See Discharge Summary      Progress towards goals / Updated goals:  Short Term Goals: To be accomplished in 2 weeks:  1.  Reduction in LE symptoms by >or= 50% and centralization of pain to allow increased ease with ADL  Status at [facility]: LB, groin, buttock and LE pain  Current status: reduction in pain by 25%, progressing     2. Patient reports reduction in pain to <or= 5/10 with ADL for increased ease with basic function  Status at [facility]: pain ranging up to 8/10  Current status: met (pain 5/10 at worst) 6/05/17     Long Term Goals: To be accomplished in 4 weeks:  1. Improved spinal mobility to >or= 80% of WNL for increased ease with dynamic activities including overhead reaching with spinal Ext and limited stooping  Status at [facility]: able to peform spinal Flex with feeling of tightness but unable to perform any spinal Ext due to sharp pain  Current status: improved spinal Ext, still restricted, progressing     2. Improved FOTO score to >or= 53/100 as evidence of improved function with housekeeping and work activities  Status at [place] [address]     3.  Patient able to perform material handing of >or= 30# including lifting, pulling, pushing to allow return to all work duties   Status at [facility]: limited tolerances, currently working with restrictions  Current status: able to perform box lift with good mechanics up to 30# without pain, goal met     4. Reduction in pain to <or = 3/10 with all ADL  Status at [facility]: pain up to 8/10 with limited activity  Current status: progressing (pain 5/10 at worst) 6/05/17    PLAN  []  Upgrade activities as tolerated     [x]  Continue plan of care  []  Update interventions per flow sheet       []  Discharge due to:_  []  Other:_      Imelda Dan, PT 6/5/2017  3:06 PM    Future Appointments  Date Time Provider Cricket Garvey   6/7/2017 8:30 AM YAMILET Castro [facility]   6/9/2017 11:00 AM Y[facility]T Aguirre [facility]   6/12/2017 1:30 PM YAMILET Castro [facility]   6/13/2017 2:30 PM YAMILET Aguirre [facility]

## 2017-06-07 ENCOUNTER — HOSPITAL ENCOUNTER (OUTPATIENT)
Dept: PHYSICAL THERAPY | Age: 37
Discharge: HOME | End: 2017-06-07
Payer: COMMERCIAL

## 2017-06-07 PROCEDURE — 97530 THERAPEUTIC ACTIVITIES: CPT

## 2017-06-07 PROCEDURE — 97110 THERAPEUTIC EXERCISES: CPT

## 2017-06-07 NOTE — PROGRESS NOTES
[facility] [address], [address] [place]  Phone: [phone]      Fax:  [phone]    Progress Note  Patient name: Nesha Walton Start of Care: 2017   Referral source: Penny Mustafa MD : 1980    Medical Diagnosis: Strain of lumbar spine [S39.012A]  SI (sacroiliac) joint dysfunction [M53.3] Onset Date: 17    Treatment Diagnosis: LS strain/SI dysfunction   Prior Hospitalization: see medical history Provider#: [id number]   Medications: Verified on Patient summary List   Comorbidities: depression, asthma, epilepsi  Prior Level of Function: full function      Visits from Start of Care: 6    Missed Visits: 0        Progress towards goals / Updated goals: Mrs. Keyona Flores has been showing excellent progress in spinal flexibility, reduction in LE/back pain and tolerance to overall function. 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 She has met 5 of 7 goals and continues with residual deficits in core strength and functional spinal stability. Recommend to continue with present treatment with focus on core strength, neutral spine and work simulation while patient returning to full work duty pending MD approval.    1. Reduction in LE symptoms by >or= 50% and centralization of pain to allow increased ease with ADL  Status at Eval: LB, groin , buttock and right LE pain  Current status: reduction in LE pain by 75%, overall less frequent episodes of LE pain, reduction in LBP, goal met    2. Patient reports reduction in pain to <or= 5/10 with ADL for increased ease with basic function  Status at Eval: pain ranging up to 8/10  Current status: average pain 3/10 with current work load, occasional pain up to 5/10 with prolonged work hours,goal met     Long Term Goals: To be accomplished in 4 weeks:  1.  Improved spinal mobility to >or= 80% of WNL for increased ease with dynamic activities including overhead reaching with spinal Ext and limited stooping  Status at [facility]: able to peform spinal Flex with feeling of tightness but unable to perform any spinal Ext due to sharp pain  Current status: functional spinal mobility, able to perform overhead reaching and occasional stooping, goal met     2. Improved FOTO score to >or= 53/100 as evidence of improved function with housekeeping and work activities  Status at [employer]: FOTO 31/100  Current status: 61/100, goal met     3. Patient able to perform material handing of >or= 30# including lifting, pulling, pushing to allow return to all work duties   Status at [employer]: limited tolerances, currently working with restrictions  Current status: able to perform box lift with good mechanics up to 30# without pain, goal met     4. Reduction in pain to <or = 3/10 with all ADL  Status at [facility]: pain up to 8/10 with limited activity  Current status: progressing (pain 5/10 at worst) 6/05/17      5.  Updated goal as of 5/7/17: Improved core strength and spinal stability to Level 3 to allow return to all work activities and full work duty with proper LS stability  Current status: residual deficit in core strength, difficulty with plank position, Level 2 stability with bio ex  Key Functional Changes: functional mobility and tolerances     ASSESSMENT/RECOMMENDATIONS:  [x]Continue therapy per initial plan/protocol at a frequency of  2 x per week for 2 weeks  []Continue therapy with the following recommended changes:_____________________      _____________________________________________________________________  []Discontinue therapy progressing towards or have reached established goals  []Discontinue therapy due to lack of appreciable progress towards goals  []Discontinue therapy due to lack of attendance or compliance  []Await Physician's recommendations/decisions regarding therapy  []Other:________________________________________________________________    Thank you for this referral.   Michael Hall, PT 6/7/2017 9:15 AM  NOTE TO PHYSICIAN:  PLEASE COMPLETE THE ORDERS BELOW AND   FAX TO [facility]: [phone]  If you are unable to process this request in 24 hours please contact our office:   [phone]  []  I have read the above report and request that my patient continue as recommended. []  I have read the above report and request that my patient continue therapy with the following changes/special instructions:________________________________________  []I have read the above report and request that my patient be discharged from therapy.     Physicians signature: ______________________________Date: ______Time:______

## 2017-06-09 ENCOUNTER — HOSPITAL ENCOUNTER (OUTPATIENT)
Dept: PHYSICAL THERAPY | Age: 37
Discharge: HOME | End: 2017-06-09
Payer: COMMERCIAL

## 2017-06-09 PROCEDURE — 97530 THERAPEUTIC ACTIVITIES: CPT

## 2017-06-09 PROCEDURE — 97110 THERAPEUTIC EXERCISES: CPT

## 2017-06-09 NOTE — PROGRESS NOTES
PT DAILY TREATMENT NOTE     Patient Name: Richi Aguilar  Date:2017  : 1980  [x]  Patient  Verified  Payor: [address] [place] [address] Road / Plan: KennethTercicae Dust / Product Type:  Workers Comp /    In [employer] time:1145  Total Treatment Time (min): 35  Visit #: 7 of 9    Treatment Area: Strain of lumbar spine [S39.012A]  SI (sacroiliac) joint dysfunction [M53.3]    SUBJECTIVE  Pain Level (0-10 scale): 0/10  Any medication changes, allergies to medications, adverse drug reactions, diagnosis change, or new procedure performed?: [x] No    [] Yes (see summary sheet for update)  Subjective functional status/changes:   [] No changes reported  Feeling better, able to do more at work, still some intermittent buttock pain yessica on right    OBJECTIVE    Modality rationale:    Min Type Additional Details    [] Estim:  []Unatt       []IFC  []Premod                        []Other:  []w/ice   []w/heat  Position:  Location:    [] Estim: []Att    []TENS instruct  []NMES                    []Other:  []w/US   []w/ice   []w/heat  Position:  Location:    []  Traction: [] Cervical       []Lumbar                       [] Prone          []Supine                       []Intermittent   []Continuous Lbs:  [] before manual  [] after manual    []  Ultrasound: []Continuous   [] Pulsed                           []1MHz   []3MHz W/cm2:  Location:    []  Iontophoresis with dexamethasone         Location: [] Take home patch   [] In clinic    []  Ice     []  heat  []  Ice massage  []  Laser   []  Anodyne Position:  Location:    []  Laser with stim  []  Other:  Position:  Location:    []  Vasopneumatic Device Pressure:       [] lo [] med [] hi   Temperature: [] lo [] med [] hi   [] Skin assessment post-treatment:  []intact []redness- no adverse reaction    []redness - adverse reaction:      min []Eval                  []Re-Eval       25 min Therapeutic Exercise:  [x] See flow sheet :advanced core ex and stretching     Rationale: decrease pain and radiculopathy to improve the patients ability to tolerate positions and ADLs. 10 min Therapeutic Activity:  [x]  See flow sheet : Body mechanics, work simulation activities, updated HE{P   Rationale: decrease pain and radiculopathy to improve the patients ability to tolerate positions and ADLs. min Neuromuscular Re-education:  []  See flow sheet :        min Manual Therapy:          min Gait Training:  ___ feet with ___ device on level surfaces with ___ level of assist   Rationale: With   [] TE   [] TA   [] neuro   [] other: Patient Education: [x] Review HEP    [] Progressed/Changed HEP based on:   [] positioning   [] body mechanics   [] transfers   [] heat/ice application    [] other:      Other Objective/Functional Measures:   Able to perform material handling up to 40# without increase in pain  Increased right Piriformis tightness compared to left hip  Spinal Flex , FFD 0\", functional mobility    Pain Level (0-10 scale) post treatment: 0/10    ASSESSMENT/Changes in Function:        Patient will continue to benefit from skilled PT services to address ROM deficits, address strength deficits, analyze and address soft tissue restrictions and assess and modify postural abnormalities to attain remaining goals.      [x] See Plan of Care  [] See progress note/recertification  [] See Discharge Summary      Progress towards goals / Updated goals: Mrs. Ming Stahl has been showing excellent progress in spinal flexibility, reduction in LE/back pain and tolerance to overall function. 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 She has met 5 of 7 goals and continues with residual deficits in core strength and functional spinal stability.  Recommend to continue with present treatment with focus on core strength, neutral spine and work simulation while patient returning to full work duty pending MD approval.    1. Reduction in LE symptoms by >or= 50% and centralization of pain to allow increased ease with ADL  Status at [facility]: LB, groin , buttock and right LE pain  Current status: reduction in LE pain by 75%, overall less frequent episodes of LE pain, reduction in LBP, goal met    2. Patient reports reduction in pain to <or= 5/10 with ADL for increased ease with basic function  Status at [facility]: pain ranging up to 8/10  Current status: average pain 3/10 with current work load, occasional pain up to 5/10 with prolonged work hours,goal met     Long Term Goals: To be accomplished in 4 weeks:  1. Improved spinal mobility to >or= 80% of WNL for increased ease with dynamic activities including overhead reaching with spinal Ext and limited stooping  Status at [facility]: able to peform spinal Flex with feeling of tightness but unable to perform any spinal Ext due to sharp pain  Current status: functional spinal mobility, able to perform overhead reaching and occasional stooping, goal met     2. Improved FOTO score to >or= 53/100 as evidence of improved function with housekeeping and work activities  Status at [facility]: FOTO 31/100  Current status: 61/100, goal met     3. Patient able to perform material handing of >or= 30# including lifting, pulling, pushing to allow return to all work duties   Status at [facility]: limited tolerances, currently working with restrictions  Current status: able to perform box lift with good mechanics up to 30# without pain, goal met     4. Reduction in pain to <or = 3/10 with all ADL  Status at [facility]: pain up to 8/10 with limited activity  Current status: progressing (pain 5/10 at worst) 6/05/17      5.  Updated goal as of 5/7/17: Improved core strength and spinal stability to Level 3 to allow return to all work activities and full work duty with proper LS stability  Current status: residual deficit in core strength, difficulty with plank position, Level 2 stability with bio ex    PLAN  []  Upgrade activities as tolerated     [x]  Continue plan of care  []  Update interventions per flow sheet       []  Discharge due to:_  []  Other:_      [address], PT 6/9/2017  3:06 PM    Future Appointments  Date Time Provider Cricket Garvey   6/12/2017 1:30 PM [address], PT [facility]   6/13/2017 2:30 PM Brittany hernández, PT [facility]

## 2017-06-12 ENCOUNTER — HOSPITAL ENCOUNTER (OUTPATIENT)
Dept: PHYSICAL THERAPY | Age: 37
Discharge: HOME | End: 2017-06-12
Payer: COMMERCIAL

## 2017-06-12 PROCEDURE — 97110 THERAPEUTIC EXERCISES: CPT

## 2017-06-12 PROCEDURE — 97530 THERAPEUTIC ACTIVITIES: CPT

## 2017-06-12 NOTE — PROGRESS NOTES
PT DAILY TREATMENT NOTE     Patient Name: Duncan Pina  Date:2017  : 1980  [x]  Patient  Verified  Payor: [facility] / Plan: Theta Stairs / Product Type:  Workers Comp /    In [employer] time:150  Total Treatment Time (min): 40  Visit #: 8 of 9    Treatment Area: Strain of lumbar spine [S39.012A]  SI (sacroiliac) joint dysfunction [M53.3]    SUBJECTIVE  Pain Level (0-10 scale): 0/10  Any medication changes, allergies to medications, adverse drug reactions, diagnosis change, or new procedure performed?: [x] No    [] Yes (see summary sheet for update)  Subjective functional status/changes:   [] No changes reported  Feeling very tired, worked 2 full day 12 hours each, doing more at work    OBJECTIVE    Modality rationale:    Min Type Additional Details    [] Estim:  []Unatt       []IFC  []Premod                        []Other:  []w/ice   []w/heat  Position:  Location:    [] Estim: []Att    []TENS instruct  []NMES                    []Other:  []w/US   []w/ice   []w/heat  Position:  Location:    []  Traction: [] Cervical       []Lumbar                       [] Prone          []Supine                       []Intermittent   []Continuous Lbs:  [] before manual  [] after manual    []  Ultrasound: []Continuous   [] Pulsed                           []1MHz   []3MHz W/cm2:  Location:    []  Iontophoresis with dexamethasone         Location: [] Take home patch   [] In clinic    []  Ice     []  heat  []  Ice massage  []  Laser   []  Anodyne Position:  Location:    []  Laser with stim  []  Other:  Position:  Location:    []  Vasopneumatic Device Pressure:       [] lo [] med [] hi   Temperature: [] lo [] med [] hi   [] Skin assessment post-treatment:  []intact []redness- no adverse reaction    []redness - adverse reaction:      min []Eval                  []Re-Eval       25 min Therapeutic Exercise:  [x] See flow sheet :advanced core ex and stretching     Rationale: decrease pain and radiculopathy to improve the patients ability to tolerate positions and ADLs. 15 min Therapeutic Activity:  [x]  See flow sheet : Body mechanics, work simulation activities, updated HE{P   Rationale: decrease pain and radiculopathy to improve the patients ability to tolerate positions and ADLs. min Neuromuscular Re-education:  []  See flow sheet :        min Manual Therapy:          min Gait Training:  ___ feet with ___ device on level surfaces with ___ level of assist   Rationale: With   [] TE   [] TA   [] neuro   [] other: Patient Education: [x] Review HEP    [] Progressed/Changed HEP based on:   [] positioning   [] body mechanics   [] transfers   [] heat/ice application    [] other:      Other Objective/Functional Measures:   Core strength improving    Pain Level (0-10 scale) post treatment: 0/10    ASSESSMENT/Changes in Function:        Patient will continue to benefit from skilled PT services to address ROM deficits, address strength deficits, analyze and address soft tissue restrictions and assess and modify postural abnormalities to attain remaining goals.      [x] See Plan of Care  [] See progress note/recertification  [] See Discharge Summary      Progress towards goals / Updated goals: Mrs. Wang Palm has been showing excellent progress in spinal flexibility, reduction in LE/back pain and tolerance to overall function. 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 She has met 5 of 7 goals and continues with residual deficits in core strength and functional spinal stability.  Recommend to continue with present treatment with focus on core strength, neutral spine and work simulation while patient returning to full work duty pending MD approval.    1. Reduction in LE symptoms by >or= 50% and centralization of pain to allow increased ease with ADL  Status at [facility]: LB, groin , buttock and right LE pain  Current status: reduction in LE pain by 75%, overall less frequent episodes of LE pain, reduction in LBP, goal met    2. Patient reports reduction in pain to <or= 5/10 with ADL for increased ease with basic function  Status at [facility]: pain ranging up to 8/10  Current status: average pain 3/10 with current work load, occasional pain up to 5/10 with prolonged work hours,goal met     Long Term Goals: To be accomplished in 4 weeks:  1. Improved spinal mobility to >or= 80% of WNL for increased ease with dynamic activities including overhead reaching with spinal Ext and limited stooping  Status at [facility]: able to peform spinal Flex with feeling of tightness but unable to perform any spinal Ext due to sharp pain  Current status: functional spinal mobility, able to perform overhead reaching and occasional stooping, goal met     2. Improved FOTO score to >or= 53/100 as evidence of improved function with housekeeping and work activities  Status at [facility]: FOTO 31/100  Current status: 61/100, goal met     3. Patient able to perform material handing of >or= 30# including lifting, pulling, pushing to allow return to all work duties   Status at [facility]: limited tolerances, currently working with restrictions  Current status: able to perform box lift with good mechanics up to 30# without pain, goal met     4. Reduction in pain to <or = 3/10 with all ADL  Status at [facility]: pain up to 8/10 with limited activity  Current status: max pain with ADL 2/10, goal met      5.  Updated goal as of 5/7/17: Improved core strength and spinal stability to Level 3 to allow return to all work activities and full work duty with proper LS stability  Current status: residual deficit in core strength, difficulty with plank position, Level 2 stability with bio ex  Current status as of 6/12/17, excellent progress in work simulation activities, progressing    PLAN  []  [address] activities as tolerated     [x]  Continue plan of care  []  Update interventions per flow sheet       []  Discharge due to:_  [] Other:_      Chyna Cifuentes PT 6/12/2017  3:06 PM    Future Appointments  Date Time Provider Cricket Garvey   6/13/2017 2:30 PM Chyna Cifuentes PT [facility]

## 2017-06-13 ENCOUNTER — HOSPITAL ENCOUNTER (OUTPATIENT)
Dept: PHYSICAL THERAPY | Age: 37
Discharge: HOME | End: 2017-06-13
Payer: COMMERCIAL

## 2017-06-13 PROCEDURE — 97110 THERAPEUTIC EXERCISES: CPT

## 2017-06-13 PROCEDURE — 97530 THERAPEUTIC ACTIVITIES: CPT

## 2017-06-13 NOTE — PROGRESS NOTES
PT DAILY TREATMENT NOTE     Patient Name: Marii Lakhani  Date:2017  : 1980  [x]  Patient  Verified  Payor: [facility] / Plan: Aracelis Titus / Product Type:  Workers Comp /    In [employer] time:150  Total Treatment Time (min): 40  Visit #: 9 of 9    Treatment Area: Strain of lumbar spine [S39.012A]  SI (sacroiliac) joint dysfunction [M53.3]    SUBJECTIVE  Pain Level (0-10 scale): 0/10  Any medication changes, allergies to medications, adverse drug reactions, diagnosis change, or new procedure performed?: [x] No    [] Yes (see summary sheet for update)  Subjective functional status/changes:   [] No changes reported  Doing well at work and using HEP/stretching/rest to manage residual symptoms    OBJECTIVE    Modality rationale:    Min Type Additional Details    [] Estim:  []Unatt       []IFC  []Premod                        []Other:  []w/ice   []w/heat  Position:  Location:    [] Estim: []Att    []TENS instruct  []NMES                    []Other:  []w/US   []w/ice   []w/heat  Position:  Location:    []  Traction: [] Cervical       []Lumbar                       [] Prone          []Supine                       []Intermittent   []Continuous Lbs:  [] before manual  [] after manual    []  Ultrasound: []Continuous   [] Pulsed                           []1MHz   []3MHz W/cm2:  Location:    []  Iontophoresis with dexamethasone         Location: [] Take home patch   [] In clinic    []  Ice     []  heat  []  Ice massage  []  Laser   []  Anodyne Position:  Location:    []  Laser with stim  []  Other:  Position:  Location:    []  Vasopneumatic Device Pressure:       [] lo [] med [] hi   Temperature: [] lo [] med [] hi   [] Skin assessment post-treatment:  []intact []redness- no adverse reaction    []redness - adverse reaction:      min []Eval                  []Re-Eval       15 min Therapeutic Exercise:  [x] See flow sheet :advanced core ex and stretching     Rationale: decrease pain and radiculopathy to improve the patients ability to tolerate positions and ADLs. 25 min Therapeutic Activity:  [x]  See flow sheet : Body mechanics, work simulation activities, review of HEP, reevaluation   Rationale: decrease pain and radiculopathy to improve the patients ability to tolerate positions and ADLs. min Neuromuscular Re-education:  []  See flow sheet :        min Manual Therapy:          min Gait Training:  ___ feet with ___ device on level surfaces with ___ level of assist   Rationale: With   [] TE   [] TA   [] neuro   [] other: Patient Education: [x] Review HEP    [] Progressed/Changed HEP based on:   [] positioning   [] body mechanics   [] transfers   [] heat/ice application    [] other:      Other Objective/Functional Measures:   FOTO 88/100  No pain with ADL/work simulation up to 60# lifting 5 reps each  Discussed HEP/body mechanics    Pain Level (0-10 scale) post treatment: 0/10    ASSESSMENT/Changes in Function:          [x] See Discharge Summary      Progress towards goals / Updated goals: Mrs. Wang Palm has been showing excellent progress in spinal flexibility, reduction in LE/back pain and tolerance to overall function. 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 She has met all goals and will be discharge to [facility]. She is able to return to full duty pending MD approval.    1. Reduction in LE symptoms by >or= 50% and centralization of pain to allow increased ease with ADL  Status at [facility]: LB, groin , buttock and right LE pain  Current status: reduction in LE pain by 75%, overall less frequent episodes of LE pain, reduction in LBP, goal met  Status at [facility]: 100% reduction in LE pain, only occasional min LBP up to 3/10 max with prolonged work related , goal met    2.  Patient reports reduction in pain to <or= 5/10 with ADL for increased ease with basic function  Status at [facility]: pain ranging up to 8/10  Current status: average pain 3/10 with current work load, occasional pain up to 5/10 with prolonged work hours,goal met  Status at [facility]: pain up to 3/10 with work simulation, mostly pain free, goal met     Long Term Goals: To be accomplished in 4 weeks:  1. Improved spinal mobility to >or= 80% of WNL for increased ease with dynamic activities including overhead reaching with spinal Ext and limited stooping  Status at [facility]: able to peform spinal Flex with feeling of tightness but unable to perform any spinal Ext due to sharp pain  Current status: functional spinal mobility, able to perform overhead reaching and occasional stooping, goal met     2. Improved FOTO score to >or= 53/100 as evidence of improved function with housekeeping and work activities  Status at [employer]: FOTO 31/100  Current status: 61/100, goal met  Status at [facility]: 88/100, goal met     3. Patient able to perform material handing of >or= 30# including lifting, pulling, pushing to allow return to all work duties   Status at [employer]: limited tolerances, currently working with restrictions  Current status: able to perform box lift with good mechanics up to 30# without pain, goal met  Status at [facility]: able to perform floor to waist level lifting up to 60# without difficulty and use of proper body mechanics, goal met     4. Reduction in pain to <or = 3/10 with all ADL  Status at [facility]: pain up to 8/10 with limited activity  Current status: max pain with ADL 2/10, goal met  Status at [facility]: max pain 3/10, mostly pain free, goal met      5.  Updated goal as of 5/7/17: Improved core strength and spinal stability to Level 3 to allow return to all work activities and full work duty with proper LS stability  Current status: residual deficit in core strength, difficulty with plank position, Level 2 stability with bio ex  Current status as of 6/12/17, excellent progress in work simulation activities, progressing  Status at [facility]: able to perform Level 3 activities without difficulty, goal met  PLAN        [x]  Discharge due to:all goal met_  []  Other:_      Brittany Linder, PT 6/13/2017  3:06 PM    No future appointments.

## 2017-06-13 NOTE — PROGRESS NOTES
[facility] in [address] [place], [address] [place] [address]  Phone: [phone]      Fax:  [phone]    Discharge Summary      Patient name: Jazmine Escalante Start of Care: 2017   Referral source: Geronimo Walker MD : 1980    Medical Diagnosis: Strain of lumbar spine [S39.012A]  SI (sacroiliac) joint dysfunction [M53.3] Onset Date: 17    Treatment Diagnosis: LS strain/SI dysfunction   Prior Hospitalization: see medical history Provider#: [id number]   Medications: Verified on Patient summary List   Comorbidities: depression, asthma, epilepsi  Prior Level of Function: full function      Visits from Start of Care: 9    Missed Visits: 0  Reporting Period : 17-17      Progress towards goals / Updated goals: Mrs. Kal Rose has been showing excellent progress in spinal flexibility, reduction in LE/back pain and tolerance to overall function. 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 She has met all goals and will be discharge to [facility]. She is able to return to full duty pending MD approval.    1. Reduction in LE symptoms by >or= 50% and centralization of pain to allow increased ease with ADL  Status at [facility]: LB, groin , buttock and right LE pain  Current status: reduction in LE pain by 75%, overall less frequent episodes of LE pain, reduction in LBP, goal met  Status at [facility]/C: 100% reduction in LE pain, only occasional min LBP up to 3/10 max with prolonged work related , goal met    2. Patient reports reduction in pain to <or= 5/10 with ADL for increased ease with basic function  Status at [facility]: pain ranging up to 8/10  Current status: average pain 3/10 with current work load, occasional pain up to 5/10 with prolonged work hours,goal met  Status at [facility]: pain up to 3/10 with work simulation, mostly pain free, goal met     Long Term Goals: To be accomplished in 4 weeks:  1.  Improved spinal mobility to >or= 80% of WNL for increased ease with dynamic activities including overhead reaching with spinal Ext and limited stooping  Status at [facility]: able to peform spinal Flex with feeling of tightness but unable to perform any spinal Ext due to sharp pain  Current status: functional spinal mobility, able to perform overhead reaching and occasional stooping, goal met     2. Improved FOTO score to >or= 53/100 as evidence of improved function with housekeeping and work activities  Status at [employer]: FOTO 31/100  Current status: 61/100, goal met  Status at D/[facility]: 88/100, goal met     3. Patient able to perform material handing of >or= 30# including lifting, pulling, pushing to allow return to all work duties   Status at [employer]: limited tolerances, currently working with restrictions  Current status: able to perform box lift with good mechanics up to 30# without pain, goal met  Status at [facility]: able to perform floor to waist level lifting up to 60# without difficulty and use of proper body mechanics, goal met     4. Reduction in pain to <or = 3/10 with all ADL  Status at [facility]: pain up to 8/10 with limited activity  Current status: max pain with ADL 2/10, goal met  Status at [facility]: max pain 3/10, mostly pain free, goal met      5.  Updated goal as of 5/7/17: Improved core strength and spinal stability to Level 3 to allow return to all work activities and full work duty with proper LS stability  Current status: residual deficit in core strength, difficulty with plank position, Level 2 stability with bio ex  Current status as of 6/12/17, excellent progress in work simulation activities, progressing  Status at [facility]: able to perform Level 3 activities without difficulty, goal met  Assessment/ Summary of Care: excellent progress    RECOMMENDATIONS:  [x]Discontinue therapy: [x]Patient has reached or is progressing toward set goals      []Patient is non-compliant or has abdicated      []Due to lack of appreciable progress towards set goals    Lazara Gonzales, PT 6/13/2017 1:38 PM

## 2017-06-14 ENCOUNTER — APPOINTMENT (OUTPATIENT)
Dept: PHYSICAL THERAPY | Age: 37
End: 2017-06-14
Payer: COMMERCIAL

## 2018-11-30 ENCOUNTER — APPOINTMENT (OUTPATIENT)
Dept: GENERAL RADIOLOGY | Age: 38
End: 2018-11-30
Attending: EMERGENCY MEDICINE
Payer: COMMERCIAL

## 2018-11-30 ENCOUNTER — HOSPITAL ENCOUNTER (EMERGENCY)
Age: 38
Discharge: HOME | End: 2018-11-30
Attending: EMERGENCY MEDICINE
Payer: COMMERCIAL

## 2018-11-30 VITALS
HEIGHT: 68 IN | BODY MASS INDEX: 41.83 KG/M2 | HEART RATE: 86 BPM | WEIGHT: 276 LBS | TEMPERATURE: 97.9 F | RESPIRATION RATE: 18 BRPM | OXYGEN SATURATION: 100 % | SYSTOLIC BLOOD PRESSURE: 127 MMHG | DIASTOLIC BLOOD PRESSURE: 73 MMHG

## 2018-11-30 DIAGNOSIS — R07.89 ATYPICAL CHEST PAIN: Primary | ICD-10-CM

## 2018-11-30 LAB
ALBUMIN SERPL-MCNC: 3.7 G/DL (ref 3.4–5)
ALBUMIN/GLOB SERPL: 1.1 {RATIO} (ref 0.8–1.7)
ALP SERPL-CCNC: 77 U/L (ref 45–117)
ALT SERPL-CCNC: 20 U/L (ref 13–56)
ANION GAP SERPL CALC-SCNC: 7 MMOL/L (ref 3–18)
APPEARANCE UR: ABNORMAL
AST SERPL-CCNC: 19 U/L (ref 15–37)
BACTERIA URNS QL MICRO: ABNORMAL /HPF
BASOPHILS # BLD: 0 K/UL (ref 0–0.1)
BASOPHILS NFR BLD: 0 % (ref 0–2)
BILIRUB SERPL-MCNC: 0.5 MG/DL (ref 0.2–1)
BILIRUB UR QL: NEGATIVE
BUN SERPL-MCNC: 16 MG/DL (ref 7–18)
BUN/CREAT SERPL: 20
CALCIUM SERPL-MCNC: 8.2 MG/DL (ref 8.5–10.1)
CHLORIDE SERPL-SCNC: 104 MMOL/L (ref 100–108)
CK MB CFR SERPL CALC: 0.8 % (ref 0–4)
CK MB SERPL-MCNC: 1 NG/ML (ref 5–25)
CK SERPL-CCNC: 119 U/L (ref 26–192)
CO2 SERPL-SCNC: 29 MMOL/L (ref 21–32)
COLOR UR: YELLOW
CREAT SERPL-MCNC: 0.82 MG/DL (ref 0.6–1.3)
DIFFERENTIAL METHOD BLD: ABNORMAL
EOSINOPHIL # BLD: 0.3 K/UL (ref 0–0.4)
EOSINOPHIL NFR BLD: 4 % (ref 0–5)
EPITH CASTS URNS QL MICRO: ABNORMAL /LPF (ref 0–5)
ERYTHROCYTE [DISTWIDTH] IN BLOOD BY AUTOMATED COUNT: 14 % (ref 11.6–14.5)
GLOBULIN SER CALC-MCNC: 3.4 G/DL (ref 2–4)
GLUCOSE SERPL-MCNC: 88 MG/DL (ref 74–99)
GLUCOSE UR STRIP.AUTO-MCNC: NEGATIVE MG/DL
HCG UR QL: NEGATIVE
HCT VFR BLD AUTO: 38.8 % (ref 35–45)
HGB BLD-MCNC: 12.2 G/DL (ref 12–16)
HGB UR QL STRIP: NEGATIVE
KETONES UR QL STRIP.AUTO: NEGATIVE MG/DL
LEUKOCYTE ESTERASE UR QL STRIP.AUTO: ABNORMAL
LYMPHOCYTES # BLD: 1.3 K/UL (ref 0.9–3.6)
LYMPHOCYTES NFR BLD: 18 % (ref 21–52)
MCH RBC QN AUTO: 27.2 PG (ref 24–34)
MCHC RBC AUTO-ENTMCNC: 31.4 G/DL (ref 31–37)
MCV RBC AUTO: 86.4 FL (ref 74–97)
MONOCYTES # BLD: 0.4 K/UL (ref 0.05–1.2)
MONOCYTES NFR BLD: 6 % (ref 3–10)
NEUTS SEG # BLD: 5.2 K/UL (ref 1.8–8)
NEUTS SEG NFR BLD: 72 % (ref 40–73)
NITRITE UR QL STRIP.AUTO: POSITIVE
PH UR STRIP: 6 [PH] (ref 5–8)
PLATELET # BLD AUTO: 281 K/UL (ref 135–420)
PMV BLD AUTO: 9.8 FL (ref 9.2–11.8)
POTASSIUM SERPL-SCNC: 3.9 MMOL/L (ref 3.5–5.5)
PROT SERPL-MCNC: 7.1 G/DL (ref 6.4–8.2)
PROT UR STRIP-MCNC: NEGATIVE MG/DL
RBC # BLD AUTO: 4.49 M/UL (ref 4.2–5.3)
RBC #/AREA URNS HPF: ABNORMAL /HPF (ref 0–5)
SODIUM SERPL-SCNC: 140 MMOL/L (ref 136–145)
SP GR UR REFRACTOMETRY: 1.02 (ref 1–1.03)
TROPONIN I SERPL-MCNC: <0.02 NG/ML (ref 0–0.04)
UROBILINOGEN UR QL STRIP.AUTO: 0.2 EU/DL (ref 0.2–1)
WBC # BLD AUTO: 7.2 K/UL (ref 4.6–13.2)
WBC URNS QL MICRO: ABNORMAL /HPF (ref 0–5)

## 2018-11-30 PROCEDURE — 81001 URINALYSIS AUTO W/SCOPE: CPT

## 2018-11-30 PROCEDURE — 99285 EMERGENCY DEPT VISIT HI MDM: CPT

## 2018-11-30 PROCEDURE — 93005 ELECTROCARDIOGRAM TRACING: CPT

## 2018-11-30 PROCEDURE — 71045 X-RAY EXAM CHEST 1 VIEW: CPT

## 2018-11-30 PROCEDURE — 82553 CREATINE MB FRACTION: CPT

## 2018-11-30 PROCEDURE — 74011250637 HC RX REV CODE- 250/637: Performed by: EMERGENCY MEDICINE

## 2018-11-30 PROCEDURE — 85025 COMPLETE CBC W/AUTO DIFF WBC: CPT

## 2018-11-30 PROCEDURE — 80053 COMPREHEN METABOLIC PANEL: CPT

## 2018-11-30 PROCEDURE — 81025 URINE PREGNANCY TEST: CPT

## 2018-11-30 RX ORDER — LORAZEPAM 1 MG/1
0.5 TABLET ORAL
Status: COMPLETED | OUTPATIENT
Start: 2018-11-30 | End: 2018-11-30

## 2018-11-30 RX ORDER — HYDROXYZINE PAMOATE 25 MG/1
25 CAPSULE ORAL
Qty: 15 CAP | Refills: 0 | Status: SHIPPED | OUTPATIENT
Start: 2018-11-30 | End: 2018-12-14

## 2018-11-30 RX ADMIN — LORAZEPAM 0.5 MG: 1 TABLET ORAL at 13:24

## 2018-11-30 NOTE — ED TRIAGE NOTES
Patient reports \"chest pressure\" that started \"a few days ago. \" Patient has been seen by [facility] for similar symptoms two days ago and was referred to cardiologist. Patient has not followed up with cardiologist yet. Patient called EMS for similar \"chest pressure\" symptoms today.

## 2018-11-30 NOTE — ED PROVIDER NOTES
EMERGENCY DEPARTMENT HISTORY AND PHYSICAL EXAM 
 
Date: 11/30/2018 Patient Name: Genice Severe Schmitz-Butts History of Presenting Illness Chief Complaint Patient presents with  Chest Pain (Angina) History Provided By: Patient Chief Complaint: chest pain Duration: 1-2 weeks ago Timing:  Intermittent Location: chest 
Associated Symptoms: nervous anxious, palpitations, chest tightness, lower back pain, and near syncope Additional History (Context):  
11:20 AM 
Deshawn Guillermo is a 34 y.o. female who presents to the emergency department via EMS C/O sudden onset sharp chest pain onset 1-2 weeks ago with this episode PTA while doing laundry. Associated sxs include nervous anxious, palpitations, chest tightness, lower back pain, and near syncope. Pt states, \"It felt like my blood pressure went up. \" Pt states that she has been feeling palpitations and chest discomfort intermittently since last year. Pt states that she was at [facility] for this 2 days ago and had an EKG done at one time \"and they saw it! \" Pt states that this pain is different compared to how it normally is. No PSHx. NKDA. Pt endorses EtOH use and cigarette smoking occasionally. Pt denies numbness, tingling, SOB, chance of pregnancy, and any other sxs or complaints. PCP: Other, MD Megan 
 
Current Outpatient Medications Medication Sig Dispense Refill  hydrOXYzine pamoate (VISTARIL) 25 mg capsule Take 1 Cap by mouth three (3) times daily as needed for Itching for up to 14 days. 15 Cap 0  
 acetaminophen-codeine (TYLENOL-CODEINE #3) 300-30 mg per tablet Take 1 Tab by mouth every four (4) hours as needed for Pain. Max Daily Amount: 6 Tabs. 12 Tab 0 Past History Past Medical History: 
Past Medical History:  
Diagnosis Date  Asthma  Seizures ([facility] [address] isolated seizure at 9years of age Past Surgical History: 
History reviewed. No pertinent surgical history. Family History: History reviewed. No pertinent family history. Social History: 
Social History Tobacco Use  Smoking status: Current Some Day Smoker  Smokeless tobacco: Current User Substance Use Topics  Alcohol use: Yes Frequency: Never Comment: \"only on [facility]  Drug use: No  
 
 
Allergies: 
No Known Allergies Review of Systems Review of Systems Respiratory: Positive for chest tightness. Negative for shortness of breath. Cardiovascular: Positive for chest pain and palpitations. Musculoskeletal: Positive for back pain. Neurological: Negative for numbness. (-) Tingling 
(+) Near syncope Psychiatric/Behavioral: The patient is nervous/anxious. All other systems reviewed and are negative. Physical Exam  
 
Vitals:  
 11/30/18 1107 11/30/18 1121 11/30/18 1127 BP: 127/73 127/73 Pulse: 83 86 Resp: 15 18 Temp: 97.9 °F (36.6 °C) 97.9 °F (36.6 °C) SpO2: 100% 100% Weight:   125.2 kg (276 lb) Height:   5' 8\" (1.727 m) Physical Exam  
Constitutional: She is oriented to person, place, and time. She appears well-developed and well-nourished. Obese female in no distress. HENT:  
Head: Normocephalic and atraumatic. Eyes: Pupils are equal, round, and reactive to light. Neck: Neck supple. Cardiovascular: Normal rate, regular rhythm, S1 normal, S2 normal and normal heart sounds. Pulmonary/Chest: Breath sounds normal. No respiratory distress. She has no wheezes. She has no rales. She exhibits no tenderness. Abdominal: Soft. She exhibits no distension and no mass. There is no tenderness. There is no guarding. Musculoskeletal: Normal range of motion. She exhibits no edema or tenderness. Neurological: She is alert and oriented to person, place, and time. No cranial nerve deficit. Skin: No rash noted. Psychiatric: She has a normal mood and affect. Her behavior is normal. Thought content normal.  
Nursing note and vitals reviewed. Diagnostic Study Results Labs - Recent Results (from the past 12 hour(s)) CBC WITH AUTOMATED DIFF Collection Time: 11/30/18 11:43 AM  
Result Value Ref Range WBC 7.2 4.6 - 13.2 K/uL  
 RBC 4.49 4.20 - 5.30 M/uL  
 HGB 12.2 12.0 - 16.0 g/dL HCT 38.8 35.0 - 45.0 % MCV 86.4 74.0 - 97.0 FL  
 MCH 27.2 24.0 - 34.0 PG  
 MCHC 31.4 31.0 - 37.0 g/dL  
 RDW 14.0 11.6 - 14.5 % PLATELET 285 574 - 136 K/uL MPV 9.8 9.2 - 11.8 FL  
 NEUTROPHILS 72 40 - 73 % LYMPHOCYTES 18 (L) 21 - 52 % MONOCYTES 6 3 - 10 % EOSINOPHILS 4 0 - 5 % BASOPHILS 0 0 - 2 %  
 ABS. NEUTROPHILS 5.2 1.8 - 8.0 K/UL  
 ABS. LYMPHOCYTES 1.3 0.9 - 3.6 K/UL  
 ABS. MONOCYTES 0.4 0.05 - 1.2 K/UL  
 ABS. EOSINOPHILS 0.3 0.0 - 0.4 K/UL  
 ABS. BASOPHILS 0.0 0.0 - 0.1 K/UL  
 DF AUTOMATED METABOLIC PANEL, COMPREHENSIVE Collection Time: 11/30/18 11:43 AM  
Result Value Ref Range Sodium 140 136 - 145 mmol/L Potassium 3.9 3.5 - 5.5 mmol/L Chloride 104 100 - 108 mmol/L  
 CO2 29 21 - 32 mmol/L Anion gap 7 3.0 - 18 mmol/L Glucose 88 74 - 99 mg/dL BUN 16 7.0 - 18 MG/DL Creatinine 0.82 0.6 - 1.3 MG/DL  
 BUN/Creatinine ratio 20 GFR est AA >60 >60 ml/min/1.73m2 GFR est non-AA >60 >60 ml/min/1.73m2 Calcium 8.2 (L) 8.5 - 10.1 MG/DL Bilirubin, total 0.5 0.2 - 1.0 MG/DL  
 ALT (SGPT) 20 13 - 56 U/L  
 AST (SGOT) 19 15 - 37 U/L Alk. phosphatase 77 45 - 117 U/L Protein, total 7.1 6.4 - 8.2 g/dL Albumin 3.7 3.4 - 5.0 g/dL Globulin 3.4 2.0 - 4.0 g/dL A-G Ratio 1.1 0.8 - 1.7 CARDIAC PANEL,(CK, CKMB & TROPONIN) Collection Time: 11/30/18 11:43 AM  
Result Value Ref Range  26 - 192 U/L  
 CK - MB 1.0 <3.6 ng/ml CK-MB Index 0.8 0.0 - 4.0 % Troponin-I, Qt. <0.02 0.0 - 0.045 NG/ML  
URINALYSIS W/ RFLX MICROSCOPIC Collection Time: 11/30/18 11:43 AM  
Result Value Ref Range Color YELLOW Appearance CLOUDY  Specific gravity 1.023 1.005 - 1.030    
 pH (UA) 6.0 5.0 - 8.0 Protein NEGATIVE  NEG mg/dL Glucose NEGATIVE  NEG mg/dL Ketone NEGATIVE  NEG mg/dL Bilirubin NEGATIVE  NEG Blood NEGATIVE  NEG Urobilinogen 0.2 0.2 - 1.0 EU/dL Nitrites POSITIVE (A) NEG Leukocyte Esterase SMALL (A) NEG    
HCG URINE, QL Collection Time: 11/30/18 11:43 AM  
Result Value Ref Range HCG urine, QL NEGATIVE  NEG    
URINE MICROSCOPIC ONLY Collection Time: 11/30/18 11:43 AM  
Result Value Ref Range WBC 2 to 5 0 - 5 /hpf  
 RBC NONE 0 - 5 /hpf Epithelial cells 2+ 0 - 5 /lpf Bacteria 4+ (A) NEG /hpf Radiologic Studies -  
XR CHEST PORT Final Result CT Results  (Last 48 hours) None CXR Results  (Last 48 hours)  
          
 11/30/18 1220  XR CHEST PORT Final result Impression:  Impression:  
Mildly underexpanded lungs without superimposed acute radiographic  
cardiopulmonary abnormality. Narrative:  Chest, single view Indication: Chest tightness with irregular heartbeat Comparison: None Findings:  Portable upright AP view of the chest was obtained. Lungs are mildly  
underexpanded but clear. There is no focal pneumonic consolidation,  
pneumothorax, or pleural effusion. Cardiac size and mediastinal contours are  
normal. Pulmonary vascularity is within normal limits. No acute osseous  
abnormality. Medications given in the ED- Medications - No data to display Medical Decision Making I am the first provider for this patient. I reviewed the vital signs, available nursing notes, past medical history, past surgical history, family history and social history. Vital Signs-Reviewed the patient's vital signs. Pulse Oximetry Analysis - 100% on room air. Cardiac Monitor: 
Rate: 74 bpm 
Rhythm: sinus rhythm EKG interpretation: (Preliminary) 11:12 AM  
NSR at 85 bpm. QRS duration at 92 ms. QTc at 449 ms.  
EKG read by Halina Quintanilla MD at 11:13 PM  
 
 Records Reviewed: Nursing Notes and Old Medical Records Provider Notes (Medical Decision Making): INITIAL CLINICAL IMPRESSION and PLANS: 
The patient presents with the primary complaint(s) of: chest pain and palpitations. The presentation, to include historical aspects and clinical findings are consistent with the DX of anxiety. However, other possible DX's to consider as primary, associated with, or exacerbated by include: 1. Acute MI 
2. ACS 3. PE 
4. Dissection 5. Pericarditis 6. PNA 7. GERD 8. GI 
9. Psych 10. Chest wall pain Procedures: 
Procedures ED Course:  
11:20 AM Initial assessment performed. The patients presenting problems have been discussed, and they are in agreement with the care plan formulated and outlined with them. I have encouraged them to ask questions as they arise throughout their visit. 12:47 PM Palpitations, paresthesias, dizziness, and SOB for quite some time. CP today is atypical and most likely related to generalized anxiety disorder. PERC rule negative. Probability for PE is low. Heart score is low. Will d/c on small dose of Vistaril and urged her to f/u with PCP for further evaluation and possible review of medications for sxs. Diagnosis and Disposition DISCHARGE NOTE: 
12:48 PM 
Elmer Aguilar's  results have been reviewed with her. She has been counseled regarding her diagnosis, treatment, and plan. She verbally conveys understanding and agreement of the signs, symptoms, diagnosis, treatment and prognosis and additionally agrees to follow up as discussed. She also agrees with the care-plan and conveys that all of her questions have been answered. I have also provided discharge instructions for her that include: educational information regarding their diagnosis and treatment, and list of reasons why they would want to return to the ED prior to their follow-up appointment, should her condition change.  She has been provided with education for proper emergency department utilization. CLINICAL IMPRESSION: 
 
1. Atypical chest pain 2. Paresthesias/numbness PLAN: 
1. D/C Home 2. Current Discharge Medication List  
  
START taking these medications Details  
hydrOXYzine pamoate (VISTARIL) 25 mg capsule Take 1 Cap by mouth three (3) times daily as needed for Itching for up to 14 days. Qty: 15 Cap, Refills: 0  
  
  
 
3. Follow-up Information Follow up With Specialties Details Why Contact Info [facility]  Schedule an appointment as soon as possible for a visit in 2 days For Primary Care Follow Up [address] [place], [address] [place] [address] 
[phone] [facility] EMERGENCY DEPT Emergency Medicine Go to If symptoms worsen, As needed [address] [place] [address] 
[phone]  
  
 
_______________________________ Attestations: This note is prepared by Bolivar Brown, acting as Scribe for Macarena Rice MD. Macarena Rice MD:  The scribe's documentation has been prepared under my direction and personally reviewed by me in its entirety. I confirm that the note above accurately reflects all work, treatment, procedures, and medical decision making performed by me. 
_______________________________

## 2018-11-30 NOTE — LETTER
[facility] [place] 
THE [facility] EMERGENCY DEPT 
[address] [address] 
[phone] Work Note Date: 11/30/2018 To Whom It May concern: 
 
Wilner Peck was seen and treated today in the emergency room by the following provider(s): 
Attending Provider: Richy Crowder MD. Please excuse missed work. Wilner Peck may return to work on 12/1/2018. Sincerely, Rajni [place] MD

## 2018-11-30 NOTE — DISCHARGE INSTRUCTIONS

## 2018-12-31 LAB
ATRIAL RATE: 85 BPM
CALCULATED P AXIS, ECG09: 49 DEGREES
CALCULATED R AXIS, ECG10: 14 DEGREES
CALCULATED T AXIS, ECG11: 14 DEGREES
DIAGNOSIS, 93000: NORMAL
P-R INTERVAL, ECG05: 162 MS
Q-T INTERVAL, ECG07: 378 MS
QRS DURATION, ECG06: 82 MS
QTC CALCULATION (BEZET), ECG08: 449 MS
VENTRICULAR RATE, ECG03: 85 BPM

## 2020-03-28 ENCOUNTER — HOSPITAL ENCOUNTER (EMERGENCY)
Age: 40
Discharge: HOME | End: 2020-03-28
Attending: EMERGENCY MEDICINE
Payer: MEDICAID

## 2020-03-28 VITALS
HEIGHT: 68 IN | TEMPERATURE: 98.7 F | SYSTOLIC BLOOD PRESSURE: 116 MMHG | DIASTOLIC BLOOD PRESSURE: 71 MMHG | WEIGHT: 280 LBS | HEART RATE: 72 BPM | OXYGEN SATURATION: 97 % | RESPIRATION RATE: 21 BRPM | BODY MASS INDEX: 42.44 KG/M2

## 2020-03-28 DIAGNOSIS — R00.2 PALPITATIONS: Primary | ICD-10-CM

## 2020-03-28 DIAGNOSIS — E03.9 HYPOTHYROIDISM, UNSPECIFIED TYPE: ICD-10-CM

## 2020-03-28 DIAGNOSIS — Z86.59 H/O ANXIETY DISORDER: ICD-10-CM

## 2020-03-28 LAB
ANION GAP SERPL CALC-SCNC: 5 MMOL/L (ref 3–18)
ATRIAL RATE: 85 BPM
BASOPHILS # BLD: 0 K/UL (ref 0–0.1)
BASOPHILS NFR BLD: 0 % (ref 0–2)
BUN SERPL-MCNC: 20 MG/DL (ref 7–18)
BUN/CREAT SERPL: 24 (ref 12–20)
CALCIUM SERPL-MCNC: 9 MG/DL (ref 8.5–10.1)
CALCULATED P AXIS, ECG09: 40 DEGREES
CALCULATED R AXIS, ECG10: 2 DEGREES
CALCULATED T AXIS, ECG11: 0 DEGREES
CHLORIDE SERPL-SCNC: 107 MMOL/L (ref 100–111)
CK MB CFR SERPL CALC: NORMAL % (ref 0–4)
CK MB SERPL-MCNC: <1 NG/ML (ref 5–25)
CK SERPL-CCNC: 122 U/L (ref 26–192)
CO2 SERPL-SCNC: 29 MMOL/L (ref 21–32)
CREAT SERPL-MCNC: 0.84 MG/DL (ref 0.6–1.3)
DIAGNOSIS, 93000: NORMAL
DIFFERENTIAL METHOD BLD: ABNORMAL
EOSINOPHIL # BLD: 0.3 K/UL (ref 0–0.4)
EOSINOPHIL NFR BLD: 5 % (ref 0–5)
ERYTHROCYTE [DISTWIDTH] IN BLOOD BY AUTOMATED COUNT: 14.1 % (ref 11.6–14.5)
GLUCOSE SERPL-MCNC: 102 MG/DL (ref 74–99)
HCG SERPL QL: NEGATIVE
HCT VFR BLD AUTO: 37.6 % (ref 35–45)
HGB BLD-MCNC: 11.9 G/DL (ref 12–16)
LYMPHOCYTES # BLD: 1.4 K/UL (ref 0.9–3.6)
LYMPHOCYTES NFR BLD: 20 % (ref 21–52)
MAGNESIUM SERPL-MCNC: 1.9 MG/DL (ref 1.6–2.6)
MCH RBC QN AUTO: 27.7 PG (ref 24–34)
MCHC RBC AUTO-ENTMCNC: 31.6 G/DL (ref 31–37)
MCV RBC AUTO: 87.4 FL (ref 74–97)
MONOCYTES # BLD: 0.5 K/UL (ref 0.05–1.2)
MONOCYTES NFR BLD: 7 % (ref 3–10)
NEUTS SEG # BLD: 4.8 K/UL (ref 1.8–8)
NEUTS SEG NFR BLD: 68 % (ref 40–73)
P-R INTERVAL, ECG05: 164 MS
PLATELET # BLD AUTO: 270 K/UL (ref 135–420)
PMV BLD AUTO: 9.9 FL (ref 9.2–11.8)
POTASSIUM SERPL-SCNC: 4 MMOL/L (ref 3.5–5.5)
Q-T INTERVAL, ECG07: 364 MS
QRS DURATION, ECG06: 88 MS
QTC CALCULATION (BEZET), ECG08: 433 MS
RBC # BLD AUTO: 4.3 M/UL (ref 4.2–5.3)
SODIUM SERPL-SCNC: 141 MMOL/L (ref 136–145)
T4 FREE SERPL-MCNC: 1 NG/DL (ref 0.7–1.5)
TROPONIN I SERPL-MCNC: <0.02 NG/ML (ref 0–0.04)
TSH SERPL DL<=0.05 MIU/L-ACNC: 5.36 UIU/ML (ref 0.36–3.74)
VENTRICULAR RATE, ECG03: 85 BPM
WBC # BLD AUTO: 7.1 K/UL (ref 4.6–13.2)

## 2020-03-28 PROCEDURE — 96374 THER/PROPH/DIAG INJ IV PUSH: CPT

## 2020-03-28 PROCEDURE — 74011250637 HC RX REV CODE- 250/637: Performed by: EMERGENCY MEDICINE

## 2020-03-28 PROCEDURE — 84703 CHORIONIC GONADOTROPIN ASSAY: CPT

## 2020-03-28 PROCEDURE — 82550 ASSAY OF CK (CPK): CPT

## 2020-03-28 PROCEDURE — 74011250636 HC RX REV CODE- 250/636: Performed by: EMERGENCY MEDICINE

## 2020-03-28 PROCEDURE — 83735 ASSAY OF MAGNESIUM: CPT

## 2020-03-28 PROCEDURE — 74011000250 HC RX REV CODE- 250: Performed by: EMERGENCY MEDICINE

## 2020-03-28 PROCEDURE — 93005 ELECTROCARDIOGRAM TRACING: CPT

## 2020-03-28 PROCEDURE — 80048 BASIC METABOLIC PNL TOTAL CA: CPT

## 2020-03-28 PROCEDURE — 85025 COMPLETE CBC W/AUTO DIFF WBC: CPT

## 2020-03-28 PROCEDURE — 99284 EMERGENCY DEPT VISIT MOD MDM: CPT

## 2020-03-28 PROCEDURE — 84439 ASSAY OF FREE THYROXINE: CPT

## 2020-03-28 PROCEDURE — 84443 ASSAY THYROID STIM HORMONE: CPT

## 2020-03-28 RX ORDER — FAMOTIDINE 10 MG/ML
20 INJECTION INTRAVENOUS
Status: COMPLETED | OUTPATIENT
Start: 2020-03-28 | End: 2020-03-28

## 2020-03-28 RX ADMIN — FAMOTIDINE 20 MG: 10 INJECTION INTRAVENOUS at 04:59

## 2020-03-28 RX ADMIN — LIDOCAINE HYDROCHLORIDE 50 ML: 20 SOLUTION ORAL; TOPICAL at 04:59

## 2020-03-28 NOTE — ED NOTES
Pt discharged home stable and ambulatory   Pain level at discharge 0/10. Pt discharged with . Reviewed discharged instructions with pt who verbalized understanding.   Patient armband removed and shredded

## 2020-03-28 NOTE — ED PROVIDER NOTES
EMERGENCY DEPARTMENT HISTORY AND PHYSICAL EXAM    Date: 3/28/2020  Patient Name: Mica Mendoza    History of Presenting Illness     Chief Complaint   Patient presents with    Palpitations         History Provided By: Patient    Additional History (Context):     3:57 AM    Janel Knapp is a 41 y.o. female with pertinent PMHx of asthma, thyroid disease (diagnosed after previous palpitations) and seizures presenting ambulatory to the ED c/o intermittent episodes of palpitations x ~3 hours ago. Pt states that \"my heart is racing as the waves of heat spread across my body\". Pt notes associated symptoms of nervous/anxiousness and chest pressure. She does note work related stress. Her h/o thyroid problems (treated with Synthroid) are frequently exacerbated by her menstrual cycle. She denies any recent medication changes. Pt denies any use of heart rate controlling medications. She does note worsened heart-burn sxs recently, not relieved with her prescribed Protonix which she takes at night. Pt denies any recent use of cough/cold medications. She carries Albuterol regularly, but denies any use in the last few hours. She denies any EtOH use or smoking tobacco in the last 24 hours. PCP: Megan Begum MD  Pt does not drink EtOH excessively or do illicit drugs. She admits to smoking tobacco occasionally. There are no other complaints, changes, or physical findings at this time. Past History     Past Medical History:  Past Medical History:   Diagnosis Date    Asthma     Endocrine disease     thyroid    Seizures ([facility] [address]     isolated seizure at 9years of age        Past Surgical History:  History reviewed. No pertinent surgical history. Family History:  History reviewed. No pertinent family history.     Social History:  Social History     Tobacco Use    Smoking status: Current Some Day Smoker    Smokeless tobacco: Current User   Substance Use Topics    Alcohol use: Yes     Frequency: Never Comment: \"only on [employer] Drug use: No       Allergies:  No Known Allergies      Review of Systems   Review of Systems   Constitutional: Negative for chills. Cardiovascular: Positive for chest pain (pressure) and palpitations. Gastrointestinal: Negative for diarrhea, nausea and vomiting. Psychiatric/Behavioral: The patient is nervous/anxious. All other systems reviewed and are negative. Physical Exam     Vitals:    03/28/20 0342   BP: (!) 184/103   Pulse: (!) 102   Resp: 20   Temp: 98.7 °F (37.1 °C)   SpO2: 100%   Weight: 127 kg (280 lb)   Height: 5' 8\" (1.727 m)     Physical Exam  Vitals signs and nursing note reviewed. Constitutional:       General: She is not in acute distress. Appearance: She is well-developed. She is obese. Comments: Obese  Comfortable appearing, nontoxic  Non-anxious appearing   HENT:      Head: Normocephalic and atraumatic. Right Ear: External ear normal.      Left Ear: External ear normal.      Mouth/Throat:      Pharynx: No oropharyngeal exudate. Eyes:      General: No scleral icterus. Conjunctiva/sclera: Conjunctivae normal.      Pupils: Pupils are equal, round, and reactive to light. Comments: No pallor   Neck:      Musculoskeletal: Normal range of motion and neck supple. Thyroid: No thyromegaly. Vascular: No JVD. Trachea: No tracheal deviation. Comments: No palpable thyromegaly   Cardiovascular:      Rate and Rhythm: Normal rate and regular rhythm. Heart sounds: Normal heart sounds. Pulmonary:      Effort: Pulmonary effort is normal. No respiratory distress. Breath sounds: Normal breath sounds. No stridor. Abdominal:      General: Bowel sounds are normal. There is no distension. Palpations: Abdomen is soft. Tenderness: There is no abdominal tenderness. There is no guarding or rebound. Musculoskeletal: Normal range of motion. General: No tenderness.       Comments: No soft tissue injuries Lymphadenopathy:      Cervical: No cervical adenopathy. Skin:     General: Skin is warm and dry. Findings: No erythema or rash. Neurological:      Mental Status: She is alert and oriented to person, place, and time. Cranial Nerves: No cranial nerve deficit. Coordination: Coordination normal.      Deep Tendon Reflexes: Reflexes are normal and symmetric. Reflexes normal.   Psychiatric:         Behavior: Behavior normal.         Thought Content: Thought content normal.         Judgment: Judgment normal.         Diagnostic Study Results     Labs -     Recent Results (from the past 12 hour(s))   EKG, 12 LEAD, INITIAL    Collection Time: 03/28/20  3:45 AM   Result Value Ref Range    Ventricular Rate 85 BPM    Atrial Rate 85 BPM    P-R Interval 164 ms    QRS Duration 88 ms    Q-T Interval 364 ms    QTC Calculation (Bezet) 433 ms    Calculated P Axis 40 degrees    Calculated R Axis 2 degrees    Calculated T Axis 0 degrees    Diagnosis       Normal sinus rhythm  Minimal voltage criteria for LVH, may be normal variant  Borderline ECG  When compared with ECG of 30-NOV-2018 11:12,  No significant change was found     CBC WITH AUTOMATED DIFF    Collection Time: 03/28/20  3:55 AM   Result Value Ref Range    WBC 7.1 4.6 - 13.2 K/uL    RBC 4.30 4.20 - 5.30 M/uL    HGB 11.9 (L) 12.0 - 16.0 g/dL    HCT 37.6 35.0 - 45.0 %    MCV 87.4 74.0 - 97.0 FL    MCH 27.7 24.0 - 34.0 PG    MCHC 31.6 31.0 - 37.0 g/dL    RDW 14.1 11.6 - 14.5 %    PLATELET 516 893 - 870 K/uL    MPV 9.9 9.2 - 11.8 FL    NEUTROPHILS 68 40 - 73 %    LYMPHOCYTES 20 (L) 21 - 52 %    MONOCYTES 7 3 - 10 %    EOSINOPHILS 5 0 - 5 %    BASOPHILS 0 0 - 2 %    ABS. NEUTROPHILS 4.8 1.8 - 8.0 K/UL    ABS. LYMPHOCYTES 1.4 0.9 - 3.6 K/UL    ABS. MONOCYTES 0.5 0.05 - 1.2 K/UL    ABS. EOSINOPHILS 0.3 0.0 - 0.4 K/UL    ABS.  BASOPHILS 0.0 0.0 - 0.1 K/UL    DF AUTOMATED     METABOLIC PANEL, BASIC    Collection Time: 03/28/20  3:55 AM   Result Value Ref Range Sodium 141 136 - 145 mmol/L    Potassium 4.0 3.5 - 5.5 mmol/L    Chloride 107 100 - 111 mmol/L    CO2 29 21 - 32 mmol/L    Anion gap 5 3.0 - 18 mmol/L    Glucose 102 (H) 74 - 99 mg/dL    BUN 20 (H) 7.0 - 18 MG/DL    Creatinine 0.84 0.6 - 1.3 MG/DL    BUN/Creatinine ratio 24 (H) 12 - 20      GFR est AA >60 >60 ml/min/1.73m2    GFR est non-AA >60 >60 ml/min/1.73m2    Calcium 9.0 8.5 - 10.1 MG/DL   MAGNESIUM    Collection Time: 03/28/20  3:55 AM   Result Value Ref Range    Magnesium 1.9 1.6 - 2.6 mg/dL   HCG QL SERUM    Collection Time: 03/28/20  3:55 AM   Result Value Ref Range    HCG, Ql. NEGATIVE  NEG     CARDIAC PANEL,(CK, CKMB & TROPONIN)    Collection Time: 03/28/20  3:55 AM   Result Value Ref Range     26 - 192 U/L    CK - MB <1.0 <3.6 ng/ml    CK-MB Index  0.0 - 4.0 %     CALCULATION NOT PERFORMED WHEN RESULT IS BELOW LINEAR LIMIT    Troponin-I, QT <0.02 0.0 - 0.045 NG/ML       Radiologic Studies -   No orders to display         Medical Decision Making   I am the first provider for this patient. I reviewed the vital signs, available nursing notes, past medical history, past surgical history, family history and social history. Vital Signs-Reviewed the patient's vital signs. Pulse Oximetry Analysis - 100% on RA     Cardiac Monitor:  Rate: 93 bpm  Rhythm: NSR    EKG interpretation: (Preliminary)  NSR at 85 bpm. No delta wave or preexcitation. No STEMI. EKG read by Marko. Lorena Pérez MD at [address] [facility]     Records Reviewed: Nursing Notes and Old Medical Records    Provider Notes (Medical Decision Making): She has evidence of simply normal sinus rhythm. No delta wave or preexcitation. If symptomatic, either skeletal muscle palpitations perhaps aggravated by her reflux. Will monitor for abnormal cardiac rhythm, PVCs, or PACs.     PROCEDURES:  Procedures    MEDICATIONS GIVEN IN THE ED:  Medications   famotidine (PF) (PEPCID) injection 20 mg (20 mg IntraVENous Given 3/28/20 0459)   mylanta/viscous lidocaine (GI COCKTAIL) (50 mL Oral Given 3/28/20 0459)        ED COURSE:   3:57 AM   Initial assessment performed. PROGRESS NOTE:  5:30 AM  Pt and/or family have been updated on their results. Pt and/or pt's family are aware of the plan of care and are in agreement. Written by Naman Restrepo, LORENA Scribe, as dictated by Karrin Coma Thedford Ormond, MD.      Diagnosis and Disposition     DISCHARGE NOTE:  5:33 AM  The patient is ready for discharge. The patient's signs, symptoms, diagnosis, and discharge instructions have been discussed and the patient and/or family has conveyed their understanding. The patient and/or family is to follow up as recommended or return to the ER should their symptoms worsen. Plan has been discussed and the patient and/or family is in agreement. Written by Naman Restrepo, LORENA Scribe, as dictated by Karrin Coma Thedford Ormond, MD.     CLINICAL IMPRESSION:  1. Palpitations    2. Hypothyroidism, unspecified type    3. H/O anxiety disorder        PLAN:  1. D/C Home  2. There are no discharge medications for this patient. 3.   Follow-up Information     Follow up With Specialties Details Why [address] [facility] at [address] an appointment as soon as possible for a visit for primary care follow up [address] [facility] [address]    THE [facility] EMERGENCY DEPT Emergency Medicine  As needed, If symptoms worsen [address] [address]  [phone]        _______________________________    Attestations: This note is prepared by Sweetie Suazo, acting as Scribe for [employer]. Thedford Ormond, MD.    [employer]. Thedford Ormond, MD:  The scribe's documentation has been prepared under my direction and personally reviewed by me in its entirety.  I confirm that the note above accurately reflects all work, treatment, procedures, and medical decision making performed by me.  _______________________________

## 2020-03-28 NOTE — DISCHARGE INSTRUCTIONS

## 2021-02-09 NOTE — PROGRESS NOTES
PT DAILY TREATMENT NOTE     Patient Name: Brian Maradiaga  HJWX:  : 1980  [x]  Patient  Verified  Payor: [facility] / Plan: [employer] / Product Type: Workers [employer] /    In [employer] time:920  Total Treatment Time (min): 40  Visit #: 6 of 9    Treatment Area: Strain of lumbar spine [S39.012A]  SI (sacroiliac) joint dysfunction [M53.3]    SUBJECTIVE  Pain Level (0-10 scale): 1-2/10  Any medication changes, allergies to medications, adverse drug reactions, diagnosis change, or new procedure performed?: [x] No    [] Yes (see summary sheet for update)  Subjective functional status/changes:   [] No changes reported  \"Still occasionally shooting in right LE yessica with bad weather. Increased ease with lifting of bread boxes up to 25#. Unsure if able to return to full duty. \"  MD visit today after PT    OBJECTIVE    Modality rationale:    Min Type Additional Details    [] Estim:  []Unatt       []IFC  []Premod                        []Other:  []w/ice   []w/heat  Position:  Location:    [] Estim: []Att    []TENS instruct  []NMES                    []Other:  []w/US   []w/ice   []w/heat  Position:  Location:    []  Traction: [] Cervical       []Lumbar                       [] Prone          []Supine                       []Intermittent   []Continuous Lbs:  [] before manual  [] after manual    []  Ultrasound: []Continuous   [] Pulsed                           []1MHz   []3MHz W/cm2:  Location:    []  Iontophoresis with dexamethasone         Location: [] Take home patch   [] In clinic    []  Ice     []  heat  []  Ice massage  []  Laser   []  Anodyne Position:  Location:    []  Laser with stim  []  Other:  Position:  Location:    []  Vasopneumatic Device Pressure:       [] lo [] med [] hi   Temperature: [] lo [] med [] hi   [] Skin assessment post-treatment:  []intact []redness- no adverse reaction    []redness - adverse reaction:      min []Eval                  []Re-Eval       15 min Therapeutic Exercise:  [x] See flow sheet :     Rationale: decrease pain and radiculopathy to improve the patients ability to tolerate positions and ADLs. 25 min Therapeutic Activity:  []  See flow sheet :  LS stability, neutral spine with postures during TE and material handling, core strength, reevaluation   Rationale: decrease pain and radiculopathy to improve the patients ability to tolerate positions and ADLs. min Neuromuscular Re-education:  []  See flow sheet :        min Manual Therapy:          min Gait Training:  ___ feet with ___ device on level surfaces with ___ level of assist   Rationale: With   [] TE   [] TA   [] neuro   [] other: Patient Education: [x] Review HEP    [] Progressed/Changed HEP based on:   [] positioning   [] body mechanics   [] transfers   [] heat/ice application    [] other:      Other Objective/Functional Measures:   Able to perform material handling up to 40# without increase in pain  75% reduction in right LE pain  Constant pressure/achiness in sacral/LB region  Spinal Flex , FFD 0\", functional mobility    Pain Level (0-10 scale) post treatment: 0/10    ASSESSMENT/Changes in Function:    Mechanical spine assessment shows a change in directional preference to extension bias. Pt abolished LBP perform REIL and ANTONIO at counter. Pt to hold flexion bias movements and postures and emphasize extension and neutral postures. Patient will continue to benefit from skilled PT services to address ROM deficits, address strength deficits, analyze and address soft tissue restrictions and assess and modify postural abnormalities to attain remaining goals.      [x] See Plan of Care  [] See progress note/recertification  [] See Discharge Summary      Progress towards goals / Updated goals: Mrs. Tanya Smith has been showing excellent progress in spinal flexibility, reduction in LE/back pain and tolerance to overall function.  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 She has met 5 of 7 goals and continues with residual deficits in core strength and functional spinal stability. Recommend to continue with present treatment with focus on core strength, neutral spine and work simulation while patient returning to full work duty pending MD approval.    1. Reduction in LE symptoms by >or= 50% and centralization of pain to allow increased ease with ADL  Status at [facility]: LB, groin , buttock and right LE pain  Current status: reduction in LE pain by 75%, overall less frequent episodes of LE pain, reduction in LBP, goal met    2. Patient reports reduction in pain to <or= 5/10 with ADL for increased ease with basic function  Status at [facility]: pain ranging up to 8/10  Current status: average pain 3/10 with current work load, occasional pain up to 5/10 with prolonged work hours,goal met     Long Term Goals: To be accomplished in 4 weeks:  1. Improved spinal mobility to >or= 80% of WNL for increased ease with dynamic activities including overhead reaching with spinal Ext and limited stooping  Status at [facility]: able to peform spinal Flex with feeling of tightness but unable to perform any spinal Ext due to sharp pain  Current status: functional spinal mobility, able to perform overhead reaching and occasional stooping, goal met     2. Improved FOTO score to >or= 53/100 as evidence of improved function with housekeeping and work activities  Status at [facility]: FOTO 31/100  Current status: 61/100, goal met     3.  Patient able to perform material handing of >or= 30# including lifting, pulling, pushing to allow return to all work duties   Status at [employer]: limited tolerances, currently working with restrictions  Current status: able to perform box lift with good mechanics up to 30# without pain, goal met     4. Reduction in pain to <or = 3/10 with all ADL  Status at [facility]: pain up to 8/10 with limited activity  Current status: progressing (pain 5/10 at worst) 6/05/17      5.  Updated goal as of 5/7/17: Improved core strength and spinal stability to Level 3 to allow return to all work activities and full work duty with proper LS stability  Current status: residual deficit in core strength, difficulty with plank position, Level 2 stability with bio ex    PLAN  []  Upgrade activities as tolerated     [x]  Continue plan of care  []  Update interventions per flow sheet       []  Discharge due to:_  []  Other:_      Alia Pastor PT 6/7/2017  3:06 PM    Future Appointments  Date Time Provider Cricket Garvey   6/9/2017 11:00 AM YAMILET Arias [facility]   6/12/2017 1:30 PM YAMILET Nicholson [facility]   6/13/2017 2:30 PM YAMILET Nicholson [facility] Sarecycline Counseling: Patient advised regarding possible photosensitivity and discoloration of the teeth, skin, lips, tongue and gums.  Patient instructed to avoid sunlight, if possible.  When exposed to sunlight, patients should wear protective clothing, sunglasses, and sunscreen.  The patient was instructed to call the office immediately if the following severe adverse effects occur:  hearing changes, easy bruising/bleeding, severe headache, or vision changes.  The patient verbalized understanding of the proper use and possible adverse effects of sarecycline.  All of the patient's questions and concerns were addressed.

## 2021-10-15 ENCOUNTER — HOSPITAL ENCOUNTER (OUTPATIENT)
Dept: PREADMISSION TESTING | Age: 41
Discharge: HOME | End: 2021-10-15
Payer: MEDICAID

## 2021-10-15 PROCEDURE — U0003 INFECTIOUS AGENT DETECTION BY NUCLEIC ACID (DNA OR RNA); SEVERE ACUTE RESPIRATORY SYNDROME CORONAVIRUS 2 (SARS-COV-2) (CORONAVIRUS DISEASE [COVID-19]), AMPLIFIED PROBE TECHNIQUE, MAKING USE OF HIGH THROUGHPUT TECHNOLOGIES AS DESCRIBED BY CMS-2020-01-R: HCPCS

## 2021-10-16 LAB — SARS-COV-2, COV2NT: NOT DETECTED

## 2021-10-18 ENCOUNTER — HOSPITAL ENCOUNTER (OUTPATIENT)
Dept: PREADMISSION TESTING | Age: 41
Discharge: HOME | End: 2021-10-18

## 2021-10-18 VITALS — HEIGHT: 68 IN | WEIGHT: 290 LBS | BODY MASS INDEX: 43.95 KG/M2

## 2021-10-18 RX ORDER — LEVOTHYROXINE SODIUM 100 UG/1
100 TABLET ORAL
COMMUNITY

## 2021-10-18 RX ORDER — CEFAZOLIN SODIUM/WATER 2 G/20 ML
2 SYRINGE (ML) INTRAVENOUS ONCE
Status: CANCELLED | OUTPATIENT
Start: 2021-10-18 | End: 2021-10-18

## 2021-10-18 RX ORDER — SODIUM CHLORIDE, SODIUM LACTATE, POTASSIUM CHLORIDE, CALCIUM CHLORIDE 600; 310; 30; 20 MG/100ML; MG/100ML; MG/100ML; MG/100ML
125 INJECTION, SOLUTION INTRAVENOUS CONTINUOUS
Status: CANCELLED | OUTPATIENT
Start: 2021-10-18

## 2021-10-18 RX ORDER — SUMATRIPTAN 50 MG/1
50 TABLET, FILM COATED ORAL
COMMUNITY

## 2021-10-18 RX ORDER — PANTOPRAZOLE SODIUM 40 MG/1
40 TABLET, DELAYED RELEASE ORAL EVERY EVENING
COMMUNITY

## 2021-10-18 RX ORDER — DILTIAZEM HYDROCHLORIDE 180 MG/1
180 CAPSULE, COATED, EXTENDED RELEASE ORAL DAILY
COMMUNITY

## 2021-10-18 NOTE — PERIOP NOTES
Patient educated on NPO, CHG, and current COVID 19 protocols. Patient states she is fully vaccinated. Patient states she provided a copy of the COVID vaccination card to Dr Jeffery Torres office. Patient states she will bring vaccination record to [facility] on 10/20/2021 to be scanned into Media. Medications reviewed. Patient educated on current visitation policies. Patient advised to leave valuables at home or with a loved one. All questions answered by RN.

## 2021-10-19 NOTE — H&P
[facility]  HISTORY AND PHYSICAL    Name:  Anoop Miranda  MR#:   [id number]  :  1980  ACCOUNT #:  [de-identified]  ADMIT DATE:  10/20/2021    Scheduled for surgery on 10/20/2021 at [address] [place] [address] Street:  The patient presented today with pain in the left heel which has been problematic for sometime, it has been unresponsive to conservative management and change in shoes. She does have significant pain. She presented today for scheduled surgical management. PAST MEDICAL HISTORY:  Includes asthma, depression, migraines, obesity and thyroid cancer. PAST SURGICAL HISTORY:  Unremarkable to the chief complaint. CURRENT MEDICATIONS:  Include betamethasone, dipropionate cream, Cardizem cyclobenzaprine, levothyroxine, Mobic, pantoprazole. ALLERGIES:  NO KNOWN DRUG ALLERGIES. SOCIAL HISTORY:  She is a former smoker. Denies alcohol products. PHYSICAL EXAMINATION:  VASCULAR:  Dorsalis pedis and posterior tibial pulses are palpable. Cap fill time less than 3 seconds. NEUROLOGIC:  All epicritic sensations are intact. There is some pain on palpation at the Achilles tendon insertion. Achilles tendon is noted to be intact. There is no palpable gapping. There is pain with palpation and lateral compression. Muscle strength testing within normal limits. ORTHOPEDIC:  Ankle joint and subtalar joint range of motion is pain-free, is restricted with dorsiflexion secondary to ankle equinus. IMPRESSION  1. Achilles tendinitis, left. 2.  Ankle equinus. 3.  Calcaneal spurring. PLAN:  I consulted the patient and we discussed options and alternatives, and recommendations were given. She was given a full surgical consultation regarding the Achilles tendon lengthening, debridement of Achilles with tenolysis and removal of calcaneal spur. All risks, benefits and alternatives were explained and all pre, francesca and postoperative course was discussed. She was given the opportunity to ask questions and all questions were answered. The consent was reviewed. All risks were discussed. It is signed and on the chart. She was given prescription for Keflex 500 mg one p.o. b.i.d. which he will begin preoperatively and one for Percocet one p.o. q.4-6 hours p.r.n. pain. We will follow in the office for postoperative management, conversion to a below-the-knee fiberglass cast.  The timeline for healing was discussed. She will call for any problems. I will plan to follow her for care postoperatively.         MEHREEN [email or web address]  D:  10/19/2021 7:03  T:  10/19/2021 8:01  JOB #:  [id number]

## 2021-10-20 ENCOUNTER — ANESTHESIA EVENT (OUTPATIENT)
Dept: SURGERY | Age: 41
End: 2021-10-20
Payer: MEDICAID

## 2021-10-20 ENCOUNTER — ANESTHESIA (OUTPATIENT)
Dept: SURGERY | Age: 41
End: 2021-10-20
Payer: MEDICAID

## 2021-10-20 ENCOUNTER — HOSPITAL ENCOUNTER (OUTPATIENT)
Age: 41
Setting detail: OUTPATIENT SURGERY
Discharge: HOME | End: 2021-10-20
Attending: PODIATRIST | Admitting: PODIATRIST
Payer: MEDICAID

## 2021-10-20 VITALS
HEIGHT: 68 IN | SYSTOLIC BLOOD PRESSURE: 132 MMHG | DIASTOLIC BLOOD PRESSURE: 84 MMHG | RESPIRATION RATE: 14 BRPM | HEART RATE: 81 BPM | TEMPERATURE: 97.3 F | OXYGEN SATURATION: 96 % | WEIGHT: 293 LBS | BODY MASS INDEX: 44.41 KG/M2

## 2021-10-20 PROBLEM — M24.573 EQUINUS CONTRACTURE OF ANKLE: Status: ACTIVE | Noted: 2021-10-20

## 2021-10-20 PROBLEM — M77.30 CALCANEAL SPUR: Status: ACTIVE | Noted: 2021-10-20

## 2021-10-20 PROBLEM — M76.60 ACHILLES TENDONITIS: Status: ACTIVE | Noted: 2021-10-20

## 2021-10-20 LAB — HCG UR QL: NEGATIVE

## 2021-10-20 PROCEDURE — 74011250637 HC RX REV CODE- 250/637: Performed by: ANESTHESIOLOGY

## 2021-10-20 PROCEDURE — 77030020782 HC GWN BAIR PAWS FLX 3M -B: Performed by: PODIATRIST

## 2021-10-20 PROCEDURE — 74011250636 HC RX REV CODE- 250/636: Performed by: ANESTHESIOLOGY

## 2021-10-20 PROCEDURE — 77030006643: Performed by: ANESTHESIOLOGY

## 2021-10-20 PROCEDURE — 76060000033 HC ANESTHESIA 1 TO 1.5 HR: Performed by: PODIATRIST

## 2021-10-20 PROCEDURE — 76210000016 HC OR PH I REC 1 TO 1.5 HR: Performed by: PODIATRIST

## 2021-10-20 PROCEDURE — 74011000250 HC RX REV CODE- 250: Performed by: ANESTHESIOLOGY

## 2021-10-20 PROCEDURE — 2709999900 HC NON-CHARGEABLE SUPPLY: Performed by: PODIATRIST

## 2021-10-20 PROCEDURE — 74011000250 HC RX REV CODE- 250: Performed by: PODIATRIST

## 2021-10-20 PROCEDURE — 74011250636 HC RX REV CODE- 250/636: Performed by: PODIATRIST

## 2021-10-20 PROCEDURE — 81025 URINE PREGNANCY TEST: CPT

## 2021-10-20 PROCEDURE — 76010000149 HC OR TIME 1 TO 1.5 HR: Performed by: PODIATRIST

## 2021-10-20 PROCEDURE — 77030020268 HC MISC GENERAL SUPPLY: Performed by: PODIATRIST

## 2021-10-20 PROCEDURE — 77030000032 HC CUF TRNQT ZIMM -B: Performed by: PODIATRIST

## 2021-10-20 PROCEDURE — C1713 ANCHOR/SCREW BN/BN,TIS/BN: HCPCS | Performed by: PODIATRIST

## 2021-10-20 PROCEDURE — 74011000250 HC RX REV CODE- 250: Performed by: NURSE ANESTHETIST, CERTIFIED REGISTERED

## 2021-10-20 PROCEDURE — 77030008683 HC TU ET CUF COVD -A: Performed by: ANESTHESIOLOGY

## 2021-10-20 PROCEDURE — 77030002916 HC SUT ETHLN J&J -A: Performed by: PODIATRIST

## 2021-10-20 PROCEDURE — 77030002922 HC SUT FBRWRE ARTH -B: Performed by: PODIATRIST

## 2021-10-20 PROCEDURE — 76210000026 HC REC RM PH II 1 TO 1.5 HR: Performed by: PODIATRIST

## 2021-10-20 PROCEDURE — 77030002933 HC SUT MCRYL J&J -A: Performed by: PODIATRIST

## 2021-10-20 PROCEDURE — 74011000258 HC RX REV CODE- 258: Performed by: ANESTHESIOLOGY

## 2021-10-20 PROCEDURE — 77030040361 HC SLV COMPR DVT MDII -B: Performed by: PODIATRIST

## 2021-10-20 PROCEDURE — 77030008477 HC STYL SATN SLP COVD -A: Performed by: ANESTHESIOLOGY

## 2021-10-20 DEVICE — OMEGA 4.75MM PEEK KNOTLESS ANCHOR SYSTEM, SINGLE
Type: IMPLANTABLE DEVICE | Site: ACHILLES TENDON | Status: FUNCTIONAL
Brand: OMEGA

## 2021-10-20 RX ORDER — ONDANSETRON 2 MG/ML
INJECTION INTRAMUSCULAR; INTRAVENOUS AS NEEDED
Status: DISCONTINUED | OUTPATIENT
Start: 2021-10-20 | End: 2021-10-20 | Stop reason: HOSPADM

## 2021-10-20 RX ORDER — KETAMINE HYDROCHLORIDE 10 MG/ML
INJECTION, SOLUTION INTRAMUSCULAR; INTRAVENOUS AS NEEDED
Status: DISCONTINUED | OUTPATIENT
Start: 2021-10-20 | End: 2021-10-20 | Stop reason: HOSPADM

## 2021-10-20 RX ORDER — NEOSTIGMINE METHYLSULFATE 1 MG/ML
INJECTION, SOLUTION INTRAVENOUS AS NEEDED
Status: DISCONTINUED | OUTPATIENT
Start: 2021-10-20 | End: 2021-10-20 | Stop reason: HOSPADM

## 2021-10-20 RX ORDER — MIDAZOLAM HYDROCHLORIDE 1 MG/ML
INJECTION, SOLUTION INTRAMUSCULAR; INTRAVENOUS
Status: COMPLETED
Start: 2021-10-20 | End: 2021-10-20

## 2021-10-20 RX ORDER — DEXAMETHASONE SODIUM PHOSPHATE 4 MG/ML
INJECTION, SOLUTION INTRA-ARTICULAR; INTRALESIONAL; INTRAMUSCULAR; INTRAVENOUS; SOFT TISSUE AS NEEDED
Status: DISCONTINUED | OUTPATIENT
Start: 2021-10-20 | End: 2021-10-20 | Stop reason: HOSPADM

## 2021-10-20 RX ORDER — DEXTROSE 50 % IN WATER (D50W) INTRAVENOUS SYRINGE
25-50 AS NEEDED
Status: DISCONTINUED | OUTPATIENT
Start: 2021-10-20 | End: 2021-10-20 | Stop reason: HOSPADM

## 2021-10-20 RX ORDER — BUPIVACAINE HYDROCHLORIDE 5 MG/ML
INJECTION, SOLUTION EPIDURAL; INTRACAUDAL AS NEEDED
Status: DISCONTINUED | OUTPATIENT
Start: 2021-10-20 | End: 2021-10-20 | Stop reason: HOSPADM

## 2021-10-20 RX ORDER — GLYCOPYRROLATE 0.2 MG/ML
INJECTION INTRAMUSCULAR; INTRAVENOUS AS NEEDED
Status: DISCONTINUED | OUTPATIENT
Start: 2021-10-20 | End: 2021-10-20 | Stop reason: HOSPADM

## 2021-10-20 RX ORDER — LIDOCAINE HYDROCHLORIDE 20 MG/ML
INJECTION, SOLUTION EPIDURAL; INFILTRATION; INTRACAUDAL; PERINEURAL AS NEEDED
Status: DISCONTINUED | OUTPATIENT
Start: 2021-10-20 | End: 2021-10-20 | Stop reason: HOSPADM

## 2021-10-20 RX ORDER — FENTANYL CITRATE 50 UG/ML
INJECTION, SOLUTION INTRAMUSCULAR; INTRAVENOUS AS NEEDED
Status: DISCONTINUED | OUTPATIENT
Start: 2021-10-20 | End: 2021-10-20 | Stop reason: HOSPADM

## 2021-10-20 RX ORDER — SODIUM CHLORIDE, SODIUM LACTATE, POTASSIUM CHLORIDE, CALCIUM CHLORIDE 600; 310; 30; 20 MG/100ML; MG/100ML; MG/100ML; MG/100ML
125 INJECTION, SOLUTION INTRAVENOUS CONTINUOUS
Status: DISCONTINUED | OUTPATIENT
Start: 2021-10-20 | End: 2021-10-20 | Stop reason: HOSPADM

## 2021-10-20 RX ORDER — OXYCODONE HYDROCHLORIDE 5 MG/1
5 TABLET ORAL ONCE
Status: COMPLETED | OUTPATIENT
Start: 2021-10-20 | End: 2021-10-20

## 2021-10-20 RX ORDER — NALOXONE HYDROCHLORIDE 0.4 MG/ML
0.1 INJECTION, SOLUTION INTRAMUSCULAR; INTRAVENOUS; SUBCUTANEOUS AS NEEDED
Status: DISCONTINUED | OUTPATIENT
Start: 2021-10-20 | End: 2021-10-20 | Stop reason: HOSPADM

## 2021-10-20 RX ORDER — CEFAZOLIN SODIUM/WATER 2 G/20 ML
2 SYRINGE (ML) INTRAVENOUS ONCE
Status: DISCONTINUED | OUTPATIENT
Start: 2021-10-20 | End: 2021-10-20 | Stop reason: DRUGHIGH

## 2021-10-20 RX ORDER — ROCURONIUM BROMIDE 10 MG/ML
INJECTION, SOLUTION INTRAVENOUS AS NEEDED
Status: DISCONTINUED | OUTPATIENT
Start: 2021-10-20 | End: 2021-10-20 | Stop reason: HOSPADM

## 2021-10-20 RX ORDER — MIDAZOLAM HYDROCHLORIDE 1 MG/ML
INJECTION, SOLUTION INTRAMUSCULAR; INTRAVENOUS AS NEEDED
Status: DISCONTINUED | OUTPATIENT
Start: 2021-10-20 | End: 2021-10-20 | Stop reason: HOSPADM

## 2021-10-20 RX ORDER — HYDROMORPHONE HYDROCHLORIDE 1 MG/ML
0.5 INJECTION, SOLUTION INTRAMUSCULAR; INTRAVENOUS; SUBCUTANEOUS
Status: DISCONTINUED | OUTPATIENT
Start: 2021-10-20 | End: 2021-10-20 | Stop reason: HOSPADM

## 2021-10-20 RX ORDER — KETOROLAC TROMETHAMINE 15 MG/ML
INJECTION, SOLUTION INTRAMUSCULAR; INTRAVENOUS AS NEEDED
Status: DISCONTINUED | OUTPATIENT
Start: 2021-10-20 | End: 2021-10-20 | Stop reason: HOSPADM

## 2021-10-20 RX ORDER — FLUMAZENIL 0.1 MG/ML
0.2 INJECTION INTRAVENOUS
Status: DISCONTINUED | OUTPATIENT
Start: 2021-10-20 | End: 2021-10-20 | Stop reason: HOSPADM

## 2021-10-20 RX ORDER — SODIUM CHLORIDE 0.9 % (FLUSH) 0.9 %
5-40 SYRINGE (ML) INJECTION AS NEEDED
Status: DISCONTINUED | OUTPATIENT
Start: 2021-10-20 | End: 2021-10-20 | Stop reason: HOSPADM

## 2021-10-20 RX ORDER — SODIUM CHLORIDE, SODIUM LACTATE, POTASSIUM CHLORIDE, CALCIUM CHLORIDE 600; 310; 30; 20 MG/100ML; MG/100ML; MG/100ML; MG/100ML
1000 INJECTION, SOLUTION INTRAVENOUS CONTINUOUS
Status: DISCONTINUED | OUTPATIENT
Start: 2021-10-20 | End: 2021-10-20 | Stop reason: HOSPADM

## 2021-10-20 RX ORDER — ONDANSETRON 2 MG/ML
4 INJECTION INTRAMUSCULAR; INTRAVENOUS ONCE
Status: COMPLETED | OUTPATIENT
Start: 2021-10-20 | End: 2021-10-20

## 2021-10-20 RX ORDER — SODIUM CHLORIDE 0.9 % (FLUSH) 0.9 %
5-40 SYRINGE (ML) INJECTION EVERY 8 HOURS
Status: DISCONTINUED | OUTPATIENT
Start: 2021-10-20 | End: 2021-10-20 | Stop reason: HOSPADM

## 2021-10-20 RX ORDER — FENTANYL CITRATE 50 UG/ML
25 INJECTION, SOLUTION INTRAMUSCULAR; INTRAVENOUS AS NEEDED
Status: DISCONTINUED | OUTPATIENT
Start: 2021-10-20 | End: 2021-10-20 | Stop reason: HOSPADM

## 2021-10-20 RX ORDER — PROPOFOL 10 MG/ML
INJECTION, EMULSION INTRAVENOUS AS NEEDED
Status: DISCONTINUED | OUTPATIENT
Start: 2021-10-20 | End: 2021-10-20 | Stop reason: HOSPADM

## 2021-10-20 RX ORDER — MAGNESIUM SULFATE 100 %
4 CRYSTALS MISCELLANEOUS AS NEEDED
Status: DISCONTINUED | OUTPATIENT
Start: 2021-10-20 | End: 2021-10-20 | Stop reason: HOSPADM

## 2021-10-20 RX ADMIN — SODIUM CHLORIDE, SODIUM LACTATE, POTASSIUM CHLORIDE, AND CALCIUM CHLORIDE: 600; 310; 30; 20 INJECTION, SOLUTION INTRAVENOUS at 14:23

## 2021-10-20 RX ADMIN — KETOROLAC TROMETHAMINE 15 MG: 15 INJECTION, SOLUTION INTRAMUSCULAR; INTRAVENOUS at 14:14

## 2021-10-20 RX ADMIN — FENTANYL CITRATE 100 MCG: 50 INJECTION, SOLUTION INTRAMUSCULAR; INTRAVENOUS at 13:38

## 2021-10-20 RX ADMIN — SODIUM CHLORIDE, SODIUM LACTATE, POTASSIUM CHLORIDE, AND CALCIUM CHLORIDE 125 ML/HR: 600; 310; 30; 20 INJECTION, SOLUTION INTRAVENOUS at 16:31

## 2021-10-20 RX ADMIN — ROCURONIUM BROMIDE 40 MG: 10 INJECTION, SOLUTION INTRAVENOUS at 13:38

## 2021-10-20 RX ADMIN — GLYCOPYRROLATE 0.8 MG: 0.2 INJECTION INTRAMUSCULAR; INTRAVENOUS at 14:15

## 2021-10-20 RX ADMIN — Medication 4 MG: at 14:15

## 2021-10-20 RX ADMIN — FENTANYL CITRATE 25 MCG: 50 INJECTION, SOLUTION INTRAMUSCULAR; INTRAVENOUS at 15:19

## 2021-10-20 RX ADMIN — OXYCODONE 5 MG: 5 TABLET ORAL at 17:01

## 2021-10-20 RX ADMIN — KETAMINE HYDROCHLORIDE 30 MG: 10 INJECTION, SOLUTION INTRAMUSCULAR; INTRAVENOUS at 13:57

## 2021-10-20 RX ADMIN — DEXAMETHASONE SODIUM PHOSPHATE 8 MG: 4 INJECTION, SOLUTION INTRAMUSCULAR; INTRAVENOUS at 13:44

## 2021-10-20 RX ADMIN — PROPOFOL 200 MG: 10 INJECTION, EMULSION INTRAVENOUS at 13:38

## 2021-10-20 RX ADMIN — PROMETHAZINE HYDROCHLORIDE 6.25 MG: 25 INJECTION INTRAMUSCULAR; INTRAVENOUS at 16:57

## 2021-10-20 RX ADMIN — SODIUM CHLORIDE, SODIUM LACTATE, POTASSIUM CHLORIDE, AND CALCIUM CHLORIDE 125 ML/HR: 600; 310; 30; 20 INJECTION, SOLUTION INTRAVENOUS at 12:37

## 2021-10-20 RX ADMIN — FENTANYL CITRATE 25 MCG: 50 INJECTION, SOLUTION INTRAMUSCULAR; INTRAVENOUS at 14:52

## 2021-10-20 RX ADMIN — ONDANSETRON HYDROCHLORIDE 4 MG: 2 INJECTION INTRAMUSCULAR; INTRAVENOUS at 13:44

## 2021-10-20 RX ADMIN — CEFAZOLIN 3 G: 1 INJECTION, POWDER, FOR SOLUTION INTRAVENOUS at 13:44

## 2021-10-20 RX ADMIN — LIDOCAINE HYDROCHLORIDE 100 MG: 20 INJECTION, SOLUTION INTRAVENOUS at 13:38

## 2021-10-20 RX ADMIN — ONDANSETRON 4 MG: 2 INJECTION INTRAMUSCULAR; INTRAVENOUS at 15:37

## 2021-10-20 RX ADMIN — MIDAZOLAM 2 MG: 1 INJECTION INTRAMUSCULAR; INTRAVENOUS at 13:28

## 2021-10-20 RX ADMIN — GLYCOPYRROLATE 0.2 MG: 0.2 INJECTION INTRAMUSCULAR; INTRAVENOUS at 13:28

## 2021-10-20 RX ADMIN — MIDAZOLAM 4 MG: 1 INJECTION INTRAMUSCULAR; INTRAVENOUS at 13:03

## 2021-10-20 NOTE — DISCHARGE INSTRUCTIONS
Post op instructions     Non weight bearing with crutches  Keep leg elevated  Ice to ankle 30 minutes on 30 minutes off for 3 days     Xiomara Corral, MDDischarge Instructions  NAVNEET RODRIGUEZ  Foot  Procedures    You are being discharged after a procedure to your Achilles. Please continue to use your crutches as long as you need them. Typically, you will be able to begin putting your heel on the floor for balance only. However, lead with the foot that has been reconstructed and stop with your good leg before you put any weight on the heel. Dressing care-  You will need to keep your foot elevated for the next several days. Let your comfort help you decide how long your foot can be down over the next few days. During the first few days, your foot will feel tight and uncomfortable if it is down too long. It is normal for you to see a little blood staining on the dressing over your incision sites. It will be necessary to keep the dressing dry at all times. NEVER remove your dressing as it has been applied to hold your foot in the corrected position. Failure to follow this instruction may result in serious compromise of your correction. Medications- Please use your pain medication as directed. Refills can only be obtained during normal office hours. It is always best in call before noon. Please use one aspirin a day (81 mg for 325 mg) for the first few months until you resume normal activity on your leg. Cautions- please report the following:  #1- unusual pain or pressure inside the dressing  #2- fever above 100.5  #3- any chest pain or shortness of breath  #4- any reactions to your medications that would necessitate a change in your medication  Call 911 for any serious life-threatening emergencies    Follow up appointments have already been arranged for you as follows:  Please call the office at [phone] if you need to change any of these.     Malorie Diaz MD 10/20/2021 2:31       DISCHARGE SUMMARY from Nurse    PATIENT INSTRUCTIONS:    After general anesthesia or intravenous sedation, for 24 hours or while taking prescription Narcotics:  · Limit your activities  · Do not drive and operate hazardous machinery  · Do not make important personal or business decisions  · Do  not drink alcoholic beverages  · If you have not urinated within 8 hours after discharge, please contact your surgeon on call. Report the following to your surgeon:  · Excessive pain, swelling, redness or odor of or around the surgical area  · Temperature over 100.5  · Nausea and vomiting lasting longer than 4 hours or if unable to take medications  · Any signs of decreased circulation or nerve impairment to extremity: change in color, persistent  numbness, tingling, coldness or increase pain  · Any questions    What to do at Home:  Recommended activity: Activity as tolerated, Activity as tolerated and no driving for today and Ambulate in house,       If you experience any of the following symptoms as  above, please follow up with Dr. Giovanna Lyons. *  Please give a list of your current medications to your Primary Care Provider. *  Please update this list whenever your medications are discontinued, doses are      changed, or new medications (including over-the-counter products) are added. *  Please carry medication information at all times in case of emergency situations. These are general instructions for a healthy lifestyle:    No smoking/ No tobacco products/ Avoid exposure to second hand smoke  Surgeon General's Warning:  Quitting smoking now greatly reduces serious risk to your health.     Obesity, smoking, and sedentary lifestyle greatly increases your risk for illness    A healthy diet, regular physical exercise & weight monitoring are important for maintaining a healthy lifestyle    You may be retaining fluid if you have a history of heart failure or if you experience any of the following symptoms:  Weight gain of 3 pounds or more overnight or 5 pounds in a week, increased swelling in our hands or feet or shortness of breath while lying flat in bed. Please call your doctor as soon as you notice any of these symptoms; do not wait until your next office visit. Patient Education   Learning About Coronavirus [phone])  Coronavirus [phone]): Overview  What is coronavirus ([id number]-[id number])? The coronavirus disease (COVID-19) is caused by a virus. It is an illness that was first found in [place], [place], in December 2019. It has since spread worldwide. The virus can cause fever, cough, and trouble breathing. In severe cases, it can cause pneumonia and make it hard to breathe without help. It can cause death. Coronaviruses are a large group of viruses. They cause the common cold. They also cause more serious illnesses like Middle East respiratory syndrome (MERS) and severe acute respiratory syndrome (SARS). COVID-19 is caused by a novel coronavirus. That means it's a new type that has not been seen in people before. This virus spreads person-to-person through droplets from coughing and sneezing. It can also spread when you are close to someone who is infected. And it can spread when you touch something that has the virus on it, such as a doorknob or a tabletop. What can you do to protect yourself from coronavirus (COVID-19)? The best way to protect yourself from getting sick is to:  · Avoid areas where there is an outbreak. · Avoid contact with people who may be infected. · Wash your hands often with soap or alcohol-based hand sanitizers. · Avoid crowds and try to stay at least 6 feet away from other people. · Wash your hands often, especially after you cough or sneeze. Use soap and water, and scrub for at least 20 seconds. If soap and water aren't available, use an alcohol-based hand . · Avoid touching your mouth, nose, and eyes. What can you do to avoid spreading the virus to others?   To help avoid spreading the virus to others:  · Cover your mouth with a tissue when you cough or sneeze. Then throw the tissue in the trash. · Use a disinfectant to clean things that you touch often. · Stay home if you are sick or have been exposed to the virus. Don't go to school, work, or public areas. And don't use public transportation. · If you are sick:  ? Leave your home only if you need to get medical care. But call the doctor's office first so they know you're coming. And wear a face mask, if you have one.  ? If you have a face mask, wear it whenever you're around other people. It can help stop the spread of the virus when you cough or sneeze. ? Clean and disinfect your home every day. Use household  and disinfectant wipes or sprays. Take special care to clean things that you grab with your hands. These include doorknobs, remote controls, phones, and handles on your refrigerator and microwave. And don't forget countertops, tabletops, bathrooms, and computer keyboards. When to call for help  Call 911 anytime you think you may need emergency care. For example, call if:  · You have severe trouble breathing. (You can't talk at all.)  · You have constant chest pain or pressure. · You are severely dizzy or lightheaded. · You are confused or can't think clearly. · Your face and lips have a blue color. · You pass out (lose consciousness) or are very hard to wake up. Call your doctor now if you develop symptoms such as:  · Shortness of breath. · Fever. · Cough. If you need to get care, call ahead to the doctor's office for instructions before you go. Make sure you wear a face mask, if you have one, to prevent exposing other people to the virus. Where can you get the latest information? The following health organizations are tracking and studying this virus. Their websites contain the most up-to-date information. Jesika Saliva also learn what to do if you think you may have been exposed to the virus. · [place]  Centers for Disease Control and Prevention (CDC): The CDC provides updated news about the disease and travel advice. The website also tells you how to prevent the spread of infection. www.cdc.gov  · World Health Organization [facility]): WHO offers information about the virus outbreaks. WHO also has travel advice. www.who.int  Current as of: April 1, 2020               Content Version: 12.4  © 6548-4721 [employer]. Care instructions adapted under license by your healthcare professional. If you have questions about a medical condition or this instruction, always ask your healthcare professional. [address] any warranty or liability for your use of this information. The discharge information has been reviewed with the patient and caregiver. The patient and caregiver verbalized understanding. Discharge medications reviewed with the patient and caregiver and appropriate educational materials and side effects teaching were provided.   ___________________________________________________________________________________________________________________________________

## 2021-10-20 NOTE — ANESTHESIA POSTPROCEDURE EVALUATION
Procedure(s):  LEFT ACHILLES LENGTHNING, DEBRIDEMENT OF ACHILLES WITH TENOLYSIS.    regional, general    Anesthesia Post Evaluation        Comments: Post-Anesthesia Evaluation and Assessment    Cardiovascular Function/Vital Signs  /76   Pulse 62   Temp 36.3 °C (97.3 °F)   Resp 11   Ht 5' 8\" (1.727 m)   Wt 133.9 kg (295 lb 3.2 oz)   SpO2 98%   BMI 44.89 kg/m²     Patient is status post Procedure(s):  LEFT ACHILLES LENGTHNING, DEBRIDEMENT OF ACHILLES WITH TENOLYSIS. Nausea/Vomiting: Controlled. Postoperative hydration reviewed and adequate. Pain:  Pain Scale 1: Numeric (0 - 10) (10/20/21 1548)  Pain Intensity 1: 0 (10/20/21 1548)   Managed. Neurological Status:   Neuro (WDL): Exceptions to WDL (10/20/21 1500)   At baseline. Mental Status and Level of Consciousness: Arousable. Pulmonary Status:   O2 Device: None (Room air) (10/20/21 1500)   Adequate oxygenation and airway patent. Complications related to anesthesia: None    Post-anesthesia assessment completed. No concerns. Patient has met all discharge requirements. Signed By: Walter Stone MD    October 20, 2021                   INITIAL Post-op Vital signs:   Vitals Value Taken Time   /79 10/20/21 1545   Temp     Pulse 60 10/20/21 1547   Resp 12 10/20/21 1547   SpO2 94 % 10/20/21 1547   Vitals shown include unvalidated device data.

## 2021-10-20 NOTE — BRIEF OP NOTE
Brief Postoperative Note    Patient: Dianne España  YOB: 1980  MRN: [id number]    Date of Procedure: 10/20/2021     Pre-Op Diagnosis: LEFT ANKLE EQUINIS, ACHILLES TENDONITIS    Post-Op Diagnosis: same    Procedure(s):  LEFT ACHILLES LENGTHNING, DEBRIDEMENT OF ACHILLES WITH TENOLYSIS, REMOVAL OF CALCANEAL SPUR    Surgeon(s):  Yuliet Siegel MD    Surgical Assistant: Surg Asst-1: Beena Situ    Anesthesia: General     Estimated Blood Loss (mL): minimal    Complications: none    Specimens: none     Implants:   Implant Name Type Inv.  Item Serial No.  Lot No. LRB No. Used Action   ANCHOR SUTURE SINGLE 4.75 MM PEEK KNOTLESS SYS OMEGA - [id number]  ANCHOR SUTURE SINGLE 4.75 MM PEEK KNOTLESS SYS OMEGA  [id number] 1 Implanted       Drains: none    Findings: calcaneal spur    Electronically Signed by Kam Anton MD on 10/20/2021 at 2:29 PM

## 2021-10-20 NOTE — OP NOTES
[facility]  OPERATIVE REPORT    Name:  Maia Abad  MR#:   [id number]  :  1980  ACCOUNT #:  [de-identified]  DATE OF SERVICE:  10/20/2021    PREOPERATIVE DIAGNOSES:  1. Achilles tendinitis. 2.  Ankle equinus. 3.  Calcaneal spurring. POSTOPERATIVE DIAGNOSES:  1. Achilles tendinitis. 2.  Ankle equinus. 3.  Calcaneal spurring. PROCEDURES PERFORMED:  1. Achilles tendon lengthening, left. 2.  Debridement of the Achilles with tenolysis. 3.  Removal of calcaneal spur, left. SURGEON:  Nikki Crow DPM    ASSISTANT:  Christiano Richardson. ANESTHESIA:  General with a local block. COMPLICATIONS:  The patient tolerated procedure and anesthesia without complication and left the operating room with vascular status intact and vital signs stable. SPECIMENS REMOVED:  bone    IMPLANTS:  none    ESTIMATED BLOOD LOSS:  Minimal.    HEMOSTASIS:  Calf tourniquet at 250 mmHg. MATERIALS:  1.  Omega 4.75 anchor. 2.  FiberWire, 2-0 Monocryl, 3-0 Monocryl, 3-0 nylon, and staples. PATHOLOGY:  None. PROCEDURE:  The patient was brought to the operating room and placed under general anesthesia and placed in the prone position. A calf tourniquet was placed on a well-padded area on the left calf. The patient's foot and leg were prepped and draped in the usual sterile manner. The leg was elevated and exsanguinated and the tourniquet was inflated. Attention was then directed to the lower leg where over the gastroc aponeurosis a 2 cm incision was made. Incision was taken down through subcutaneous tissue making sure to identify, retract, and preserve all neurovascular and tendon structures encountered. Hemostasis was achieved via electrocautery. At this time, the medial and lateral borders of the gastroc aponeurosis was identified. At this time, a S[place]ato type gastroc recession was performed and the foot was dorsiflexed with ample amount of motion noted.   At this time, deep closure followed with 3-0 Monocryl, subcuticular layer was closed with 3-0 Monocryl, and skin was closed with 4-0 Monocryl and subcuticular stitch. Attention was then directed to the heel, where a 12-cm incision was made. Incision was taken down to the Achilles tendon. At this time, a midline incision was made in the Achilles to expose the hypertrophied calcaneal spur. Utilizing an osteotome, the prominent spur was resected. The posterior superior calcaneal exostosis was also resected using an osteotome and all rough edges smoothed to even contour with the use of a power bur and hand rasp. The wound was then flushed with copious amounts of sterile saline solution. At this time, the medial and lateral borders of the Achilles tendon were then sutured with two FiberWire with modified  Almita stitch. Utilizing standard fixation techniques, the Omega anchor was placed into the calcaneus and tacked back down to bone. The wound was flushed with copious amounts of sterile saline solution. At this time, the tendon was then reinforced with 2-0 Monocryl in a running interlocking stitch. Deep closure followed with 3-0 Monocryl, skin was closed with 4-0 Monocryl and 4-0 nylon with some staples. A dry sterile compressive bandage consisting of Xeroform, 4x4's, and a well-padded bandage was applied. Tourniquet was released. Hyperemic flush was noted to return to all digits, 1 through 5. She was placed into a fiberglass splint. She tolerated the procedure without complications and left the operating room with vascular status intact. She enjoyed an uneventful time in the recovery room, was given both oral and written postoperative instructions, will follow in the office for postoperative management and conversion to a below-knee fiberglass cast.      Dominic Connell DPM      [id number]  D:  10/20/2021 14:37  T:  10/20/2021 19:52  JOB #:  [id number]  CC:   [facility]

## 2021-10-20 NOTE — ANESTHESIA PREPROCEDURE EVALUATION
Relevant Problems   No relevant active problems       Anesthetic History   No history of anesthetic complications            Review of Systems / Medical History  Patient summary reviewed, nursing notes reviewed and pertinent labs reviewed    Pulmonary  Within defined limits          Asthma        Neuro/Psych     seizures         Cardiovascular    Hypertension                   GI/Hepatic/Renal     GERD           Endo/Other      Hypothyroidism  Morbid obesity     Other Findings              Physical Exam    Airway             Cardiovascular               Dental         Pulmonary                 Abdominal  GI exam deferred       Other Findings            Anesthetic Plan    ASA: 2  Anesthesia type: MAC, regional and general - backup - popliteal fossa block      Post-op pain plan if not by surgeon: peripheral nerve block single      Anesthetic plan and risks discussed with: Patient      Risk of a block include nerve injury, bleeding, infection, and failure as the most common ones although they rare.

## 2021-10-20 NOTE — PROGRESS NOTES
Patient is scheduled for an Achilles tendon lengthening, debridement of Achilles with tenolysis and removal of a calcaneal spur. All risks benefits discussed and all options reviewed. She was given opportunity to ask questions and all answered.  Consent signed and on chart

## 2022-03-18 PROBLEM — M76.60 ACHILLES TENDONITIS: Status: ACTIVE | Noted: 2021-10-20

## 2022-03-19 PROBLEM — M77.30 CALCANEAL SPUR: Status: ACTIVE | Noted: 2021-10-20

## 2022-03-20 PROBLEM — M24.573 EQUINUS CONTRACTURE OF ANKLE: Status: ACTIVE | Noted: 2021-10-20

## 2022-04-22 ENCOUNTER — HOSPITAL ENCOUNTER (EMERGENCY)
Age: 42
Discharge: HOME | End: 2022-04-22
Attending: EMERGENCY MEDICINE
Payer: MEDICAID

## 2022-04-22 VITALS
HEIGHT: 68 IN | HEART RATE: 91 BPM | DIASTOLIC BLOOD PRESSURE: 75 MMHG | SYSTOLIC BLOOD PRESSURE: 157 MMHG | TEMPERATURE: 97.7 F | WEIGHT: 290 LBS | OXYGEN SATURATION: 99 % | RESPIRATION RATE: 18 BRPM | BODY MASS INDEX: 43.95 KG/M2

## 2022-04-22 DIAGNOSIS — M62.830 MUSCLE SPASM OF BACK: ICD-10-CM

## 2022-04-22 DIAGNOSIS — M54.41 ACUTE BILATERAL LOW BACK PAIN WITH RIGHT-SIDED SCIATICA: Primary | ICD-10-CM

## 2022-04-22 PROCEDURE — 74011250636 HC RX REV CODE- 250/636: Performed by: PHYSICIAN ASSISTANT

## 2022-04-22 PROCEDURE — 99284 EMERGENCY DEPT VISIT MOD MDM: CPT

## 2022-04-22 PROCEDURE — 96372 THER/PROPH/DIAG INJ SC/IM: CPT

## 2022-04-22 RX ORDER — KETOROLAC TROMETHAMINE 15 MG/ML
15 INJECTION, SOLUTION INTRAMUSCULAR; INTRAVENOUS
Status: COMPLETED | OUTPATIENT
Start: 2022-04-22 | End: 2022-04-22

## 2022-04-22 RX ORDER — METHOCARBAMOL 750 MG/1
750 TABLET, FILM COATED ORAL
Qty: 15 TABLET | Refills: 0 | Status: SHIPPED | OUTPATIENT
Start: 2022-04-22 | End: 2022-10-03

## 2022-04-22 RX ORDER — PREDNISONE 10 MG/1
TABLET ORAL
Qty: 21 TABLET | Refills: 0 | Status: SHIPPED | OUTPATIENT
Start: 2022-04-22 | End: 2022-10-03

## 2022-04-22 RX ADMIN — KETOROLAC TROMETHAMINE 15 MG: 15 INJECTION, SOLUTION INTRAMUSCULAR; INTRAVENOUS at 22:42
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
## 2022-05-05 ENCOUNTER — TRANSCRIBE ORDER (OUTPATIENT)
Dept: SCHEDULING | Age: 42
End: 2022-05-05

## 2022-05-05 DIAGNOSIS — S39.012A LUMBAR STRAIN: Primary | ICD-10-CM

## 2022-10-03 ENCOUNTER — HOSPITAL ENCOUNTER (EMERGENCY)
Age: 42
Discharge: HOME | End: 2022-10-03
Attending: EMERGENCY MEDICINE
Payer: MEDICAID

## 2022-10-03 VITALS
DIASTOLIC BLOOD PRESSURE: 80 MMHG | HEART RATE: 70 BPM | SYSTOLIC BLOOD PRESSURE: 138 MMHG | OXYGEN SATURATION: 96 % | HEIGHT: 68 IN | BODY MASS INDEX: 44.24 KG/M2 | RESPIRATION RATE: 17 BRPM | WEIGHT: 291.89 LBS | TEMPERATURE: 98.6 F

## 2022-10-03 DIAGNOSIS — G43.909 MIGRAINE WITHOUT STATUS MIGRAINOSUS, NOT INTRACTABLE, UNSPECIFIED MIGRAINE TYPE: ICD-10-CM

## 2022-10-03 DIAGNOSIS — R56.9 CONVULSIONS, UNSPECIFIED CONVULSION TYPE (HCC): Primary | ICD-10-CM

## 2022-10-03 LAB
ALBUMIN SERPL-MCNC: 3.7 G/DL (ref 3.4–5)
ALBUMIN/GLOB SERPL: 0.9 {RATIO} (ref 0.8–1.7)
ALP SERPL-CCNC: 89 U/L (ref 45–117)
ALT SERPL-CCNC: 23 U/L (ref 13–56)
ANION GAP SERPL CALC-SCNC: 4 MMOL/L (ref 3–18)
AST SERPL-CCNC: 38 U/L (ref 10–38)
ATRIAL RATE: 91 BPM
BASOPHILS # BLD: 0.1 K/UL (ref 0–0.1)
BASOPHILS NFR BLD: 1 % (ref 0–2)
BILIRUB SERPL-MCNC: 0.5 MG/DL (ref 0.2–1)
BUN SERPL-MCNC: 14 MG/DL (ref 7–18)
BUN/CREAT SERPL: 18 (ref 12–20)
CALCIUM SERPL-MCNC: 9.2 MG/DL (ref 8.5–10.1)
CALCULATED P AXIS, ECG09: 54 DEGREES
CALCULATED R AXIS, ECG10: 25 DEGREES
CALCULATED T AXIS, ECG11: 15 DEGREES
CHLORIDE SERPL-SCNC: 107 MMOL/L (ref 100–111)
CO2 SERPL-SCNC: 28 MMOL/L (ref 21–32)
CREAT SERPL-MCNC: 0.78 MG/DL (ref 0.6–1.3)
DIAGNOSIS, 93000: NORMAL
DIFFERENTIAL METHOD BLD: NORMAL
EOSINOPHIL # BLD: 0.3 K/UL (ref 0–0.4)
EOSINOPHIL NFR BLD: 4 % (ref 0–5)
ERYTHROCYTE [DISTWIDTH] IN BLOOD BY AUTOMATED COUNT: 14.4 % (ref 11.6–14.5)
ETHANOL SERPL-MCNC: <3 MG/DL (ref 0–3)
GLOBULIN SER CALC-MCNC: 4 G/DL (ref 2–4)
GLUCOSE SERPL-MCNC: 101 MG/DL (ref 74–99)
HCT VFR BLD AUTO: 40.4 % (ref 35–45)
HGB BLD-MCNC: 12.7 G/DL (ref 12–16)
IMM GRANULOCYTES # BLD AUTO: 0 K/UL (ref 0–0.04)
IMM GRANULOCYTES NFR BLD AUTO: 0 % (ref 0–0.5)
LACTATE SERPL-SCNC: 1.1 MMOL/L (ref 0.4–2)
LYMPHOCYTES # BLD: 2.2 K/UL (ref 0.9–3.6)
LYMPHOCYTES NFR BLD: 27 % (ref 21–52)
MCH RBC QN AUTO: 27.7 PG (ref 24–34)
MCHC RBC AUTO-ENTMCNC: 31.4 G/DL (ref 31–37)
MCV RBC AUTO: 88.2 FL (ref 78–100)
MONOCYTES # BLD: 0.6 K/UL (ref 0.05–1.2)
MONOCYTES NFR BLD: 7 % (ref 3–10)
NEUTS SEG # BLD: 4.9 K/UL (ref 1.8–8)
NEUTS SEG NFR BLD: 61 % (ref 40–73)
NRBC # BLD: 0 K/UL (ref 0–0.01)
NRBC BLD-RTO: 0 PER 100 WBC
P-R INTERVAL, ECG05: 170 MS
PLATELET # BLD AUTO: 350 K/UL (ref 135–420)
PMV BLD AUTO: 10.1 FL (ref 9.2–11.8)
POTASSIUM SERPL-SCNC: 4.6 MMOL/L (ref 3.5–5.5)
PROT SERPL-MCNC: 7.7 G/DL (ref 6.4–8.2)
Q-T INTERVAL, ECG07: 374 MS
QRS DURATION, ECG06: 86 MS
QTC CALCULATION (BEZET), ECG08: 460 MS
RBC # BLD AUTO: 4.58 M/UL (ref 4.2–5.3)
SODIUM SERPL-SCNC: 139 MMOL/L (ref 136–145)
VENTRICULAR RATE, ECG03: 91 BPM
WBC # BLD AUTO: 8.2 K/UL (ref 4.6–13.2)

## 2022-10-03 PROCEDURE — 93005 ELECTROCARDIOGRAM TRACING: CPT

## 2022-10-03 PROCEDURE — 96372 THER/PROPH/DIAG INJ SC/IM: CPT

## 2022-10-03 PROCEDURE — 74011250636 HC RX REV CODE- 250/636: Performed by: EMERGENCY MEDICINE

## 2022-10-03 PROCEDURE — 80053 COMPREHEN METABOLIC PANEL: CPT

## 2022-10-03 PROCEDURE — 96374 THER/PROPH/DIAG INJ IV PUSH: CPT

## 2022-10-03 PROCEDURE — 83605 ASSAY OF LACTIC ACID: CPT

## 2022-10-03 PROCEDURE — 85025 COMPLETE CBC W/AUTO DIFF WBC: CPT

## 2022-10-03 PROCEDURE — 82077 ASSAY SPEC XCP UR&BREATH IA: CPT

## 2022-10-03 PROCEDURE — 99284 EMERGENCY DEPT VISIT MOD MDM: CPT

## 2022-10-03 RX ORDER — DIAZEPAM 10 MG/2ML
2.5 INJECTION INTRAMUSCULAR
Status: COMPLETED | OUTPATIENT
Start: 2022-10-03 | End: 2022-10-03

## 2022-10-03 RX ORDER — HALOPERIDOL 5 MG/ML
3 INJECTION INTRAMUSCULAR ONCE
Status: COMPLETED | OUTPATIENT
Start: 2022-10-03 | End: 2022-10-03

## 2022-10-03 RX ORDER — ONDANSETRON 2 MG/ML
4 INJECTION INTRAMUSCULAR; INTRAVENOUS
Status: COMPLETED | OUTPATIENT
Start: 2022-10-03 | End: 2022-10-03

## 2022-10-03 RX ORDER — DIAZEPAM 10 MG/2ML
5 INJECTION INTRAMUSCULAR
Status: DISCONTINUED | OUTPATIENT
Start: 2022-10-03 | End: 2022-10-03

## 2022-10-03 RX ORDER — DIAZEPAM 10 MG/2ML
5 INJECTION INTRAMUSCULAR
Status: COMPLETED | OUTPATIENT
Start: 2022-10-03 | End: 2022-10-03

## 2022-10-03 RX ADMIN — DIAZEPAM 5 MG: 5 INJECTION INTRAMUSCULAR; INTRAVENOUS at 02:04

## 2022-10-03 RX ADMIN — HALOPERIDOL LACTATE 3 MG: 5 INJECTION, SOLUTION INTRAMUSCULAR at 04:24

## 2022-10-03 RX ADMIN — ONDANSETRON HYDROCHLORIDE 4 MG: 2 INJECTION INTRAMUSCULAR; INTRAVENOUS at 01:52

## 2022-10-03 RX ADMIN — DIAZEPAM 2.5 MG: 5 INJECTION INTRAMUSCULAR; INTRAVENOUS at 04:24

## 2022-10-03 NOTE — ED TRIAGE NOTES
Patient arrives to ED with c/c of seizure-like activity, unsure when started. Patient is able to answer questions appropriately, she speaks very quietly but speaks clearly. She reports the spasms to her body come and go. She sees a neurologist but takes no medications for this. VSS. Patient states neurologist gave her gabapentin for these episodes but she has not started taking them yet. Patient has no bowel or bladder incontinence.

## 2022-10-03 NOTE — ED NOTES
came out to nursing station stating his wife was \"convulsing\". Dr. Ann Stephen aware and at bedside. New orders received for additional medications. This nurse entered room to administer medications and when administering medications patient stopped convulsing to let me give her IM injections.
Patient reports she is feeling much better, pain 0/10. I have reviewed discharge instructions with the patient. The patient verbalized understanding. Patient armband removed and shredded  Patient ambulated out of department with no difficulty.
Patient resting comfortably at this time. VSS. Will continue to monitor.
Patient speaking normally in complete sentences with no difficulty. Patient requesting to go to the bathroom. She states she's been holding it since she left the house prior to coming to ED. Patient up to bedside commode with no difficulty.
Patient states she is ready to go home to her kids. Provider aware.
Patient refused, states she feels comfortable with Kazakh speaking RN/No-Patient/Caregiver offered and refused free interpretation services.

## 2022-10-03 NOTE — ED PROVIDER NOTES
EMERGENCY DEPARTMENT HISTORY AND PHYSICAL EXAM    Date: 10/3/2022  Patient Name: Elidia Mendoza    History of Presenting Illness     Chief Complaint   Patient presents with    Seizure         History Provided By: Patient and Patient's     Additional History (Context):   1:28 AM  Lindsay Kathleen is a 38 y.o. female with PMHX of \"spasticity\" and seizure-like activity, complex migraines, thyroid disease, reflux, hypertension, morbid obesity who presents to the emergency department C/O of body aches chest pain associated with seizures. Problems relative to her convulsive activity. She states she takes gabapentin or Imitrex and remains partially awake during her episodes. There is no head injury or fevers vomiting or diarrhea or incontinence of bladder or bowel. .    Social History  Denies smoking drinking or drugs    Family History  Mother with hypertension mental illness and stroke, father with cancer, grandmother with mental illness. PCP: Mohamud Hernandez NP    Current Outpatient Medications   Medication Sig Dispense Refill    levothyroxine (SYNTHROID) 100 mcg tablet Take 100 mcg by mouth Daily (before breakfast). dilTIAZem ER (CARDIZEM CD) 180 mg capsule Take 180 mg by mouth daily. pantoprazole (PROTONIX) 40 mg tablet Take 40 mg by mouth every evening. SUMAtriptan (IMITREX) 50 mg tablet Take 50 mg by mouth once as needed for Migraine. Past History     Past Medical History:  Past Medical History:   Diagnosis Date    Asthma     no inhaler    Endocrine disease     thyroid    GERD (gastroesophageal reflux disease)     Hypertension     Morbid obesity ([address]     Seizures ([address]     isolated seizure at 9years of age     Thyroid disease        Past Surgical History:  History reviewed. No pertinent surgical history. Family History:  History reviewed. No pertinent family history.     Social History:  Social History     Tobacco Use    Smoking status: Never    Smokeless tobacco: Never   Vaping Use    Vaping Use: Never used   Substance Use Topics    Alcohol use: Yes     Comment: \"only on occasions\"     Drug use: Not Currently     Types: Marijuana       Allergies:  No Known Allergies      Review of Systems   Review of Systems   Cardiovascular:  Positive for chest pain. Musculoskeletal:  Positive for myalgias. Neurological:  Positive for tremors, seizures (spasticity) and headaches. All other systems reviewed and are negative. Physical Exam     Vitals:    10/03/22 0532 10/03/22 0538 10/03/22 0548 10/03/22 0602   BP: 115/67  125/71 126/84   Pulse: 60  66 70   Resp: 21   17   Temp:       SpO2: 97% 99%  98%   Weight:       Height:         Physical Exam  Vitals and nursing note reviewed. Constitutional:       General: She is not in acute distress. Appearance: She is well-developed. She is morbidly obese. She is not diaphoretic. HENT:      Head: Normocephalic and atraumatic. Comments: No blood in the airway or teeth no tongue biting or cheek injuries. Eyes:      General: No scleral icterus. Extraocular Movements:      Right eye: Normal extraocular motion. Left eye: Normal extraocular motion. Conjunctiva/sclera: Conjunctivae normal.      Pupils: Pupils are equal, round, and reactive to light. Neck:      Trachea: No tracheal deviation. Cardiovascular:      Rate and Rhythm: Normal rate and regular rhythm. Heart sounds: Normal heart sounds. Pulmonary:      Effort: Pulmonary effort is normal. No respiratory distress. Breath sounds: Normal breath sounds. No stridor. Abdominal:      General: Bowel sounds are normal. There is no distension. Palpations: Abdomen is soft. Tenderness: There is no abdominal tenderness. There is no rebound. Musculoskeletal:         General: No tenderness. Normal range of motion. Cervical back: Normal range of motion and neck supple.       Comments: Grossly unremarkable without abnormalities   Skin: General: Skin is warm and dry. Capillary Refill: Capillary refill takes less than 2 seconds. Findings: No erythema or rash. Neurological:      Mental Status: She is alert and oriented to person, place, and time. GCS: GCS eye subscore is 4. GCS verbal subscore is 5. GCS motor subscore is 5. Cranial Nerves: No cranial nerve deficit. Comments: Patient with whole body shaking and body rolling like a log speaking, and a hushed and whispered voice while shaking. Unable to follow commands currently were present but sharply removed from any noxious stimulus. Psychiatric:         Mood and Affect: Affect is labile. Speech: Speech is delayed. Speech is not slurred. Behavior: Behavior is slowed. Thought Content: Thought content normal.         Judgment: Judgment normal.     Diagnostic Study Results     Labs -  Recent Results (from the past 24 hour(s))   CBC WITH AUTOMATED DIFF    Collection Time: 10/03/22  1:15 AM   Result Value Ref Range    WBC 8.2 4.6 - 13.2 K/uL    RBC 4.58 4.20 - 5.30 M/uL    HGB 12.7 12.0 - 16.0 g/dL    HCT 40.4 35.0 - 45.0 %    MCV 88.2 78.0 - 100.0 FL    MCH 27.7 24.0 - 34.0 PG    MCHC 31.4 31.0 - 37.0 g/dL    RDW 14.4 11.6 - 14.5 %    PLATELET 785 235 - 687 K/uL    MPV 10.1 9.2 - 11.8 FL    NRBC 0.0 0  WBC    ABSOLUTE NRBC 0.00 0.00 - 0.01 K/uL    NEUTROPHILS 61 40 - 73 %    LYMPHOCYTES 27 21 - 52 %    MONOCYTES 7 3 - 10 %    EOSINOPHILS 4 0 - 5 %    BASOPHILS 1 0 - 2 %    IMMATURE GRANULOCYTES 0 0.0 - 0.5 %    ABS. NEUTROPHILS 4.9 1.8 - 8.0 K/UL    ABS. LYMPHOCYTES 2.2 0.9 - 3.6 K/UL    ABS. MONOCYTES 0.6 0.05 - 1.2 K/UL    ABS. EOSINOPHILS 0.3 0.0 - 0.4 K/UL    ABS. BASOPHILS 0.1 0.0 - 0.1 K/UL    ABS. IMM.  GRANS. 0.0 0.00 - 0.04 K/UL    DF AUTOMATED     METABOLIC PANEL, COMPREHENSIVE    Collection Time: 10/03/22  1:15 AM   Result Value Ref Range    Sodium 139 136 - 145 mmol/L    Potassium 4.6 3.5 - 5.5 mmol/L    Chloride 107 100 - 111 mmol/L    CO2 28 21 - 32 mmol/L    Anion gap 4 3.0 - 18 mmol/L    Glucose 101 (H) 74 - 99 mg/dL    BUN 14 7.0 - 18 MG/DL    Creatinine 0.78 0.6 - 1.3 MG/DL    BUN/Creatinine ratio 18 12 - 20      GFR est AA >60 >60 ml/min/1.73m2    GFR est non-AA >60 >60 ml/min/1.73m2    Calcium 9.2 8.5 - 10.1 MG/DL    Bilirubin, total 0.5 0.2 - 1.0 MG/DL    ALT (SGPT) 23 13 - 56 U/L    AST (SGOT) 38 10 - 38 U/L    Alk.  phosphatase 89 45 - 117 U/L    Protein, total 7.7 6.4 - 8.2 g/dL    Albumin 3.7 3.4 - 5.0 g/dL    Globulin 4.0 2.0 - 4.0 g/dL    A-G Ratio 0.9 0.8 - 1.7     LACTIC ACID    Collection Time: 10/03/22  1:15 AM   Result Value Ref Range    Lactic acid 1.1 0.4 - 2.0 MMOL/L   ETHYL ALCOHOL    Collection Time: 10/03/22  1:15 AM   Result Value Ref Range    ALCOHOL(ETHYL),SERUM <3 0 - 3 MG/DL   EKG, 12 LEAD, INITIAL    Collection Time: 10/03/22  1:21 AM   Result Value Ref Range    Ventricular Rate 91 BPM    Atrial Rate 91 BPM    P-R Interval 170 ms    QRS Duration 86 ms    Q-T Interval 374 ms    QTC Calculation (Bezet) 460 ms    Calculated P Axis 54 degrees    Calculated R Axis 25 degrees    Calculated T Axis 15 degrees    Diagnosis       Normal sinus rhythm  Cannot rule out Anterior infarct , age undetermined  Abnormal ECG  When compared with ECG of 28-MAR-2020 03:45,  Nonspecific T wave abnormality has replaced inverted T waves in Inferior   leads  Nonspecific T wave abnormality now evident in Anterior leads          Radiologic Studies -   No orders to display     CT Results  (Last 48 hours)      None          CXR Results  (Last 48 hours)      None            Medications given in the ED-  Medications   ondansetron (ZOFRAN) injection 4 mg (4 mg IntraVENous Given 10/3/22 0152)   diazePAM (VALIUM) injection 5 mg (5 mg IntraMUSCular Given 10/3/22 1934)   haloperidol lactate (HALDOL) injection 3 mg (3 mg IntraMUSCular Given 10/3/22 6884)   diazePAM (VALIUM) injection 2.5 mg (2.5 mg IntraMUSCular Given 10/3/22 0424) Medical Decision Making   I am the first provider for this patient. I reviewed the vital signs, available nursing notes, past medical history, past surgical history, family history and social history. Vital Signs-Reviewed the patient's vital signs. Pulse Oximetry Analysis - 96% on room air    Cardiac Monitor:  Rate: 71 bpm  Rhythm: Sinus rhythm    EKG interpretation: (Preliminary)  1:21 AM    Normal sinus rhythm, nonspecific ST changes, rate 91, QTC is 460 ms. EKG read by Beth Browne MD      Records Reviewed: NURSING NOTES AND PREVIOUS MEDICAL RECORDS    Provider Notes (Medical Decision Making):   Patient with spasticity and convulsive activity while awake. She speaks to us in a quiet whispered voice during and after the episodes. This would be very unusual for partial complex seizure. Pseudoseizure or cognitive disorder or conversion very likely. Salts electrolytes came back unremarkable. White count is normal with no left shift. Blood gas normal.  She responded well to Valium and Haldol. Discharge her with outpatient follow-up. Procedures:  Procedures    ED Course:   1:20 AM: Initial assessment performed. The patients presenting problems have been discussed, and they are in agreement with the care plan formulated and outlined with them. I have encouraged them to ask questions as they arise throughout their visit. Diagnosis and Disposition       DISCHARGE NOTE:  6:07 AM  Saige Mendoza's  results have been reviewed with her. She has been counseled regarding her diagnosis, treatment, and plan. She verbally conveys understanding and agreement of the signs, symptoms, diagnosis, treatment and prognosis and additionally agrees to follow up as discussed. She also agrees with the care-plan and conveys that all of her questions have been answered.   I have also provided discharge instructions for her that include: educational information regarding their diagnosis and treatment, and list of reasons why they would want to return to the ED prior to their follow-up appointment, should her condition change. She has been provided with education for proper emergency department utilization. CLINICAL IMPRESSION:    1. Convulsions, unspecified convulsion type ([address]    2. Migraine without status migrainosus, not intractable, unspecified migraine type        PLAN:  1. D/C Home  2. Current Discharge Medication List        3. Follow-up Information       Follow up With Specialties Details Why Contact Charles Zhang NP Nurse Practitioner In 1 week  [address]  [phone]      [facility] Neurology [facility]  Schedule an appointment as soon as possible for a visit   [address] [address]  [phone]          _______________________________    This note was partially transcribed via voice recognition software. Although efforts have been made to catch any discrepancies, it may contain sound alike words, grammatical errors, or nonsensical words.

## 2022-10-09 ENCOUNTER — HOSPITAL ENCOUNTER (EMERGENCY)
Age: 42
Discharge: HOME | End: 2022-10-09
Attending: STUDENT IN AN ORGANIZED HEALTH CARE EDUCATION/TRAINING PROGRAM
Payer: MEDICAID

## 2022-10-09 ENCOUNTER — APPOINTMENT (OUTPATIENT)
Dept: GENERAL RADIOLOGY | Age: 42
End: 2022-10-09
Attending: STUDENT IN AN ORGANIZED HEALTH CARE EDUCATION/TRAINING PROGRAM
Payer: MEDICAID

## 2022-10-09 VITALS
HEART RATE: 74 BPM | OXYGEN SATURATION: 100 % | TEMPERATURE: 98.4 F | SYSTOLIC BLOOD PRESSURE: 143 MMHG | DIASTOLIC BLOOD PRESSURE: 76 MMHG | RESPIRATION RATE: 14 BRPM | BODY MASS INDEX: 44.38 KG/M2 | WEIGHT: 291.89 LBS

## 2022-10-09 DIAGNOSIS — S93.401A SPRAIN OF RIGHT ANKLE, UNSPECIFIED LIGAMENT, INITIAL ENCOUNTER: Primary | ICD-10-CM

## 2022-10-09 PROCEDURE — 75810000053 HC SPLINT APPLICATION

## 2022-10-09 PROCEDURE — 73630 X-RAY EXAM OF FOOT: CPT

## 2022-10-09 PROCEDURE — 73610 X-RAY EXAM OF ANKLE: CPT

## 2022-10-09 PROCEDURE — 74011250637 HC RX REV CODE- 250/637: Performed by: PHYSICIAN ASSISTANT

## 2022-10-09 PROCEDURE — 99283 EMERGENCY DEPT VISIT LOW MDM: CPT

## 2022-10-09 RX ORDER — ACETAMINOPHEN 325 MG/1
975 TABLET ORAL
Status: COMPLETED | OUTPATIENT
Start: 2022-10-09 | End: 2022-10-09

## 2022-10-09 RX ADMIN — ACETAMINOPHEN 975 MG: 325 TABLET, FILM COATED ORAL at 19:21

## 2022-10-09 NOTE — ED PROVIDER NOTES
EMERGENCY DEPARTMENT HISTORY AND PHYSICAL EXAM    Date: 10/9/2022  Patient Name: Leilani Mendoza    History of Presenting Illness     Chief Complaint   Patient presents with    Foot Injury     The patient tripped on a step in her home. She injured her right ankle, swelling and pain present. History Provided By: Patient    6:09 PM  Juhi Khalil is a 38 y.o. female with PMHX of HTN, asthma, hypothyroidism, GERD who presents to the emergency department via EMS C/O right ankle and foot pain. Patient states just prior to arrival she was stepping off a step when her ankle rolled causing her to fall and she felt a pop. Pt denies any other injuries, extremity numbness or weakness, knee pain, and any other sxs or complaints. PCP: Merary Aponte NP    Current Facility-Administered Medications   Medication Dose Route Frequency Provider Last Rate Last Admin    acetaminophen (TYLENOL) tablet 975 mg  975 mg Oral NOW GEOFF Galindo         Current Outpatient Medications   Medication Sig Dispense Refill    levothyroxine (SYNTHROID) 100 mcg tablet Take 100 mcg by mouth Daily (before breakfast). dilTIAZem ER (CARDIZEM CD) 180 mg capsule Take 180 mg by mouth daily. pantoprazole (PROTONIX) 40 mg tablet Take 40 mg by mouth every evening. SUMAtriptan (IMITREX) 50 mg tablet Take 50 mg by mouth once as needed for Migraine. Past History     Past Medical History:  Past Medical History:   Diagnosis Date    Asthma     no inhaler    Endocrine disease     thyroid    GERD (gastroesophageal reflux disease)     Hypertension     Morbid obesity ([address]     Seizures ([facility] [address] [facility]     isolated seizure at 9years of age     Thyroid disease        Past Surgical History:  History reviewed. No pertinent surgical history. Family History:  History reviewed. No pertinent family history.     Social History:  Social History     Tobacco Use    Smoking status: Never    Smokeless tobacco: Never   Vaping Use Vaping Use: Never used   Substance Use Topics    Alcohol use: Yes     Comment: \"only on occasions\"     Drug use: Not Currently     Types: Marijuana       Allergies:  No Known Allergies      Review of Systems   Review of Systems   Constitutional: Negative. Musculoskeletal:  Positive for arthralgias and joint swelling. Skin: Negative. Neurological:  Negative for weakness and numbness. All other systems reviewed and are negative. Physical Exam     Vitals:    10/09/22 1753   BP: (!) 143/76   Pulse: 74   Resp: 14   Temp: 98.4 °F (36.9 °C)   SpO2: 100%   Weight: 132.4 kg (291 lb 14.2 oz)     Physical Exam  Vitals and nursing note reviewed. Constitutional:       General: She is not in acute distress. Appearance: Normal appearance. She is normal weight. HENT:      Head: Normocephalic and atraumatic. Musculoskeletal:      Right knee: Normal.        Feet:    Skin:     General: Skin is warm and dry. Neurological:      Mental Status: She is alert. Diagnostic Study Results     Labs -   No results found for this or any previous visit (from the past 12 hour(s)). Radiologic Studies -   X-ray of the right foot shows no acute process. X-ray of the right ankle shows no acute process. Pending review by radiologist.  XR FOOT RT MIN 3 V   Final Result      1. Findings concerning for ligamentous avulsion fracture at the inferior lateral   ankle, potentially peroneus ligament. 2. Navicular distal medial corner fracture, with minimal displaced fracture   fragment. XR ANKLE RT MIN 3 V   Final Result      1. Findings concerning for ligamentous avulsion fracture at the inferior lateral   ankle, potentially peroneus ligament. 2. Navicular distal medial corner fracture, with minimal displaced fracture   fragment.         CT Results  (Last 48 hours)      None          CXR Results  (Last 48 hours)      None            Medications given in the ED-  Medications   acetaminophen (TYLENOL) tablet 003 mg (has no administration in time range)         Medical Decision Making   I am the first provider for this patient. I reviewed the vital signs, available nursing notes, past medical history, past surgical history, family history and social history. Vital Signs-Reviewed the patient's vital signs. Records Reviewed: Nursing Notes      Procedures:  Procedures    ED Course:  6:09 PM   Initial assessment performed. The patients presenting problems have been discussed, and they are in agreement with the care plan formulated and outlined with them. I have encouraged them to ask questions as they arise throughout their visit. Provider Notes (Medical Decision Making): Irina Flores is a 38 y.o. female presents with right lateral ankle and foot pain and swelling after inversion injury when she slipped off of a step prior to arrival.  She is neurovascular intact with focal swelling and tenderness just distal to her lateral malleolus but no deformity. No other injuries noted. X-rays of the right foot and ankle as noted but raise concern for tiny avulsion fracture and ligamentous injury. Recommend RICE, crutches, ankle splint, nonweightbearing. She has a knee Rollator and is established with podiatrist Dr. Donald Alfaro so will follow-up with him. Diagnosis and Disposition       DISCHARGE NOTE:    Saige Mendoza's  results have been reviewed with her. She has been counseled regarding her diagnosis, treatment, and plan. She verbally conveys understanding and agreement of the signs, symptoms, diagnosis, treatment and prognosis and additionally agrees to follow up as discussed. She also agrees with the care-plan and conveys that all of her questions have been answered.   I have also provided discharge instructions for her that include: educational information regarding their diagnosis and treatment, and list of reasons why they would want to return to the ED prior to their follow-up appointment, should her condition change. She has been provided with education for proper emergency department utilization. CLINICAL IMPRESSION:    1. Sprain of right ankle, unspecified ligament, initial encounter        PLAN:  1. D/C Home  2. Current Discharge Medication List        3. Follow-up Information       Follow up With Specialties Details Why Contact Info    Iqra Vu DPM Podiatry Schedule an appointment as soon as possible for a visit   [address] [place] [address]  [phone]      [facility] EMERGENCY DEPT Emergency Medicine  As needed, If symptoms worsen [address] [address]  [phone]          _______________________________      Please note that this dictation was completed with [email or web address], the computer voice recognition software. Quite often unanticipated grammatical, syntax, homophones, and other interpretive errors are inadvertently transcribed by the computer software. Please disregard these errors. Please excuse any errors that have escaped final proofreading.

## 2022-12-07 ENCOUNTER — HOSPITAL ENCOUNTER (OUTPATIENT)
Dept: PHYSICAL THERAPY | Age: 42
Discharge: HOME | End: 2022-12-07
Payer: MEDICAID

## 2022-12-07 PROCEDURE — 97161 PT EVAL LOW COMPLEX 20 MIN: CPT

## 2022-12-07 NOTE — PROGRESS NOTES
[facility]  [address], [address] [facility] [address]  Ph [phone]  Fx [phone]    Plan of Care/ Statement of Necessity for Physical Therapy Services    Patient name: Jayme Tang Start of Care: 2022   Referral source: Beryle Mayo., PA : 1980    Medical Diagnosis: Pain in right ankle and joints of right foot [M25.571]   Onset Date 10/1/22    Treatment Diagnosis: right ankle/foot pain                                              ICD-10: M25.571   Prior Hospitalization: see medical history Provider#: [id number]   Medications: Verified on Patient summary List    Comorbidities: chronic right foot pain since 2022, s/p left Achilles tightening and calcaneal bone spur removal 2021, depression, OA, chronic LBP with upcoming ablation, hypertension, hypothyroid, obesity, restless leg syndrome, \"heart palpitations\"  Substance Use: [] tobacco use, [] alcohol use, [] other:   Prior Level of Function: working full-time as  at [employer], mother of 3 active kids  [x] Unrestricted with functional activities and ADLs  [x] No assistive device  [x] active lifestyle, [] moderately active lifestyle, [] sedentary lifestyle  Patients Goals:  \"for my foot to feel better, so I can get back to work\"      The Plan of Care and following information is based on the information from the initial evaluation. Assessment/ key information: Patient is a pleasant 38 y.o. female presenting to skilled PT c/o right foot pain that began in October when she misstepped on a step while staying at a hotel. Patient describes hearing a \"crunching\" sensation at the time of injury. Patient states a recent MRI showed \"swelling and a bone bruise of my heel bone. \"  Patient states she was placed in a soft case initially, but then she was transitioned into a CAM boot.   Patient states she was using a knee scooter when she was in the community, but she was walking with the boot at home.  Today is the first day she's been out of the boot and into athletic shoes. Patient states her pain is gradually decreasing, but being in her shoes today has slightly increased pain. Patient states she can ascend stairs with a step-through pattern without difficulty, but she does step-to pattern when coming downstairs secondary to pain when he loads weight onto her heel of her right foot. Patient states she is limited in walking shorter distances and states she hasn't yet tried walking on unlevel surfaces. Patient feels like her balance has decreased since her injury, including \"one time I almost fell\". Patient denies any falls within the last year and states she has had trouble with her balance even before her injury. Patient works as the  at [employer], but states she has been out of work since the injury in October. Patient hopes to return to work upon her follow-up with MD next week. Patient denies having any hobbies, stating she mostly works. Patient reports getting an injection last week and states this helped a little bit, temporarily. Of note, patient reports about known bilateral plantar heel spurs that she's known about for a few years. Patient states she wears OTC orthotics because she knows she has flat feet. Today's examination revealed bilateral pes planus, decreased bilateral calf flexibility, decreased AROM, decreased ankle strength, abnormal gait mechanics, and as well as TTP upon palpation of patient's plantar heel at medial calcaneal tubercle. Patient was educated about the goals of physical therapy as well as proper shoewear and foot support.   Patient would benefit from skilled PT services to modify and progress therapeutic interventions, address functional mobility deficits, address ROM deficits, address strength deficits, analyze and address soft tissue restrictions, analyze and cue movement patterns, analyze and modify body mechanics/ergonomics, and assess and modify postural abnormalities to attain remaining goals. Patient did well with today's evaluation. Patient was educated in [address] [facility] and all questions were answered.     Evaluation Complexity History MEDIUM  Complexity : 1-2 comorbidities / personal factors will impact the outcome/ POC ; Examination LOW Complexity : 1-2 Standardized tests and measures addressing body structure, function, activity limitation and / or participation in recreation  ;Presentation LOW Complexity : Stable, uncomplicated  ;Clinical Decision Making MEDIUM Complexity : FOTO score of 26-74 : FOTO score = an established functional score where 100 = no disability  Overall Complexity Rating: LOW     Problem List: pain affecting function, decrease ROM, decrease strength, edema affecting function, impaired gait/ balance, decrease ADL/ functional abilitiies, decrease activity tolerance, decrease flexibility/ joint mobility, and decrease transfer abilities   Treatment Plan may include any combination of the following: Therapeutic exercise, Neuromuscular reeducation, Manual therapy, Therapeutic activity, Self care/home management, Electric stim unattended , Vasopneumatic device, Aquatic therapy, Gait training, Ultrasound, Mechanical traction, Electric stim attended, Needle insertion w/o injection (1 or 2 muscles), and Needle insertion w/o injection (3+ muscles)  Vasopnuematic compression justification:  Per bilateral girth measures taken and listed above the edema is considered significant and having an impact on the patient's strength, balance, and gait  Patient / Family readiness to learn indicated by: asking questions, trying to perform skills, and interest  Persons(s) to be included in education: patient (P)  Barriers to Learning/Limitations: None  Measures taken if barriers to learning: n/a  Patient Self Reported Health Status: fair  Rehabilitation Potential: good    Short Term Goals:    to be accomplished within 8 treatments:     Patient will be compliant and independent with prescribed HEP(s) in order to assist in maximizing therapeutic gains and improving overall function. Eval: HEP to be issued and reviewed with patient within 1-2 visits     Patient will report at least a 25% reduction in pain/symptoms while performing ADLs/functional activities in order to improve overall tolerance to functional movements and progress towards PLOF. Eval: pain ranges from 5-10/10; currently 3/10     Patient will be able to ambulate with normal gait mechanics in order to improve overall mobility and return patient to PLOF. Eval: WBOS, antalgic including lacking heel strike and decreased push off with medial heel whip, mildly slow gait speed     Long Term Goals:     to be accomplished within 16 treatments:     Patient will score at least 62 points on FOTO in order to maximize function and promote patient satisfaction with overall outcome. (Francisca Lyell is an established functional score where a score of 100 = no disability)  Eval: 51     Patient will report less than or equal to 2/10 pain during all functional activities/ADLs in order to improve QOL and return to patient's PLOF. Eval: pain ranges from 5-10/10; currently 3/10     Patient will increase active dorsiflexion to at least 10 degrees in order to improve toe clearance during ambulation and to improve calf flexibility. Eval: active DF to 2 degs     Patient will demonstrate at least 5/5 strength bilaterally in order to be able to safely perform functional activities and demonstrate improved stability and strength. Eval: grossly 5/5 except right ankle INV/EV 4-/5 (within available range)    Frequency / Duration: Patient to be seen 2 times per week for 16 treatments.     Patient/ Caregiver education and instruction: Diagnosis, prognosis, other proper shoewear and orthotic usage   [x]  Plan of care has been reviewed with DEYA Tang, PT 12/7/2022 11:14 AM    ________________________________________________________________________    I certify that the above Therapy Services are being furnished while the patient is under my care. I agree with the treatment plan and certify that this therapy is necessary.     Physician's Signature:_____________________Date:____________TIME:________                                      Clarnce Fee., PA      ** Signature, Date and Time must be completed for valid certification **  Please sign and return to [facility]  [address], [address]  Ph [phone]  Fx [phone]

## 2022-12-07 NOTE — PROGRESS NOTES
PHYSICAL THERAPY EVALUATION / DAILY TREATMENT      Patient Name: Lindsay Kathleen  Date: 2022  : 1980  [x] Patient  Verified  Payor: BLUE CROSS MEDICAID / Plan: [facility] CROSS Maxene Fines / Product Type: Managed Care Medicaid /    In Time: 12:36  Out Time: 1:12  Total Treatment Time (min): 36  Visit #: 1    Medicare / Debbie Moran Only   Total Timed Codes (min):  5 1:1 Treatment Time:  36     Treatment Area: Pain in right ankle and joints of right foot [M25.571]    SUBJECTIVE:  Chief Complaint/Mechanism of Injury:   Patient is a pleasant 38 y.o. female with c/o right foot pain that began in October when she misstepped on a step while staying at a hotel. Patient describes hearing a \"crunching\" sensation at the time of injury. Patient states a recent MRI showed \"swelling and a bone bruise of my heel bone. \"  Patient states she was placed in a soft case initially, but then she was transitioned into a CAM boot. Patient states she was using a knee scooter when she was in the community, but she was walking with the boot at home. Today is the first day she's been out of the boot and into athletic shoes. Patient states her pain is gradually decreasing, but being in her shoes today has slightly increased pain. Patient states she can ascend stairs with a step-through pattern without difficulty, but she does step-to pattern when coming downstairs secondary to pain when he loads weight onto her heel of her right foot. Patient states she is limited in walking shorter distances and states she hasn't yet tried walking on unlevel surfaces. Patient feels like her balance has decreased since her injury, including \"one time I almost fell\". Patient denies any falls within the last year and states she has had trouble with her balance even before her injury. Patient works as the  at [employer], but states she has been out of work since the injury in October.   Patient hopes to return to work upon her follow-up with MD next week. Patient denies having any hobbies, stating she mostly works. Patient reports getting an injection last week and states this helped a little bit, temporarily. Of note, patient reports about known bilateral plantar heel spurs that she's known about for a few years. Patient states she wears OTC orthotics because she knows she has flat feet.     Pain Level (out of 0-10 scale): pain ranges from 5-10/10; currently 3/10  [] constant, [x] intermittent, [x] improving, [] worsening, [] no change since onset  Alleviating Factors: rest, elevating, icing  Previous Treatment for Current Injury: CAM boot x several months, injection last week  Diagnostic Imaging for Current Injury: xrays, MRI  Living Situation: lives with family in a 2 story home with 1 steps to enter, [] with handrail  Hand Dominance: [] right handed, [x] left handed, [] ambidextrous    Comorbidities: chronic right foot pain since October 2022, s/p left Achilles tightening and calcaneal bone spur removal October 2021, depression, OA, chronic LBP with upcoming ablation, hypertension, hypothyroid, obesity, restless leg syndrome, \"heart palpitations\"  Substance Use: [] tobacco use, [] alcohol use, [] other:   Prior Level of Function: working full-time as  at [employer], mother of 3 active kids  [x] Unrestricted with functional activities and ADLs  [x] No assistive device  [x] active lifestyle, [] moderately active lifestyle, [] sedentary lifestyle  Patients Goals:  \"for my foot to feel better, so I can get back to work\"    Potential Barriers for PT: [] pain, [] financial, [] time, [] transportation, [] other:  Motivation: Good  Cognition: A&O x 3  Denies Red Flags: SOB, chest pain, dizziness/lightheadedness, blurred/double vision, HA, chills/fevers, night sweats, change in bowel/bladder control, abdominal pain, difficulty swallowing, slurred speech, unexplained weight gain/loss, nausea, and/or vomiting. Any medication changes, allergies to medications, adverse drug reactions, diagnosis change, or new procedure performed?: [x] No     [] Yes (see summary sheet for update)      OBJECTIVE/EXAMINATION:    31 min [x] Eval                  [] Re-Evaluation     5 min Therapeutic Exercise:  [x] See flow sheet :   Rationale: patient education of proper shoewear and orthotic use; education about Achilles injury including need for improving calf flexibility and eccentric strength          With   [x] TE   [] TA   [] Neuro   [] Other: Patient Education: [x] Review HEP    [] Progressed/Changed HEP based on:   [] positioning   [] body mechanics   [] transfers   [] heat/ice application    [] other:      Physical Therapy Evaluation:    Inspection:   [x] Patient in no apparent distress                    Posture: poor posture with loss of cervical lordosis and forward shoulders, bilateral pes planus, curvature of right foot second digit    Gait: WBOS, antalgic including lacking heel strike and decreased push off with medial heel whip, mildly slow gait speed    Palpation: TTP along medial calcaneal tubercle with palpable edematous tissue in this region    AROM/PROM (degrees): Right Ankle: DF 2/8, PF WNL, INV 35/42, EV 12/22; Left Ankle: WNL except DF 2/8    MMT: Bilateral Ankles: grossly 5/5 except right ankle INV/EV 4-/5 (within available range)    Special Tests: n/a    Other Comments: none     Pain Level (out of 0-10 scale) Post Treatment: 3/10      ASSESSMENT:  Patient is a pleasant 38 y.o. female presenting to skilled PT c/o right foot pain that began in October when she misstepped on a step while staying at a hotel. Patient describes hearing a \"crunching\" sensation at the time of injury. Patient states a recent MRI showed \"swelling and a bone bruise of my heel bone. \"  Patient states she was placed in a soft case initially, but then she was transitioned into a CAM boot.   Patient states she was using a knee scooter when she was in the community, but she was walking with the boot at home. Today is the first day she's been out of the boot and into athletic shoes. Patient states her pain is gradually decreasing, but being in her shoes today has slightly increased pain. Patient states she can ascend stairs with a step-through pattern without difficulty, but she does step-to pattern when coming downstairs secondary to pain when he loads weight onto her heel of her right foot. Patient states she is limited in walking shorter distances and states she hasn't yet tried walking on unlevel surfaces. Patient feels like her balance has decreased since her injury, including \"one time I almost fell\". Patient denies any falls within the last year and states she has had trouble with her balance even before her injury. Patient works as the  at [employer], but states she has been out of work since the injury in October. Patient hopes to return to work upon her follow-up with MD next week. Patient denies having any hobbies, stating she mostly works. Patient reports getting an injection last week and states this helped a little bit, temporarily. Of note, patient reports about known bilateral plantar heel spurs that she's known about for a few years. Patient states she wears OTC orthotics because she knows she has flat feet. Today's examination revealed bilateral pes planus, decreased bilateral calf flexibility, decreased AROM, decreased ankle strength, abnormal gait mechanics, and as well as TTP upon palpation of patient's plantar heel at medial calcaneal tubercle. Patient was educated about the goals of physical therapy as well as proper shoewear and foot support.   Patient would benefit from skilled PT services to modify and progress therapeutic interventions, address functional mobility deficits, address ROM deficits, address strength deficits, analyze and address soft tissue restrictions, analyze and cue movement patterns, analyze and modify body mechanics/ergonomics, and assess and modify postural abnormalities to attain remaining goals. Patient did well with today's evaluation. Patient was educated in [address] and all questions were answered. [x]  See Plan of Care  []  See Progress Note/Recertification  []  See Discharge Summary         GOALS:  Short Term Goals:  to be accomplished within 8 treatments:    Patient will be compliant and independent with prescribed HEP(s) in order to assist in maximizing therapeutic gains and improving overall function. Eval: HEP to be issued and reviewed with patient within 1-2 visits    Patient will report at least a 25% reduction in pain/symptoms while performing ADLs/functional activities in order to improve overall tolerance to functional movements and progress towards PLOF. Eval: pain ranges from 5-10/10; currently 3/10    Patient will be able to ambulate with normal gait mechanics in order to improve overall mobility and return patient to PLOF. Eval: WBOS, antalgic including lacking heel strike and decreased push off with medial heel whip, mildly slow gait speed    Long Term Goals:  to be accomplished within 16 treatments:    Patient will score at least 62 points on FOTO in order to maximize function and promote patient satisfaction with overall outcome. (Bennye Peels is an established functional score where a score of 100 = no disability)  Eval: 51    Patient will report less than or equal to 2/10 pain during all functional activities/ADLs in order to improve QOL and return to patient's PLOF. Eval: pain ranges from 5-10/10; currently 3/10    Patient will increase active dorsiflexion to at least 10 degrees in order to improve toe clearance during ambulation and to improve calf flexibility.   Eval: active DF to 2 degs    Patient will demonstrate at least 5/5 strength bilaterally in order to be able to safely perform functional activities and demonstrate improved stability and strength. Eval: grossly 5/5 except right ankle INV/EV 4-/5 (within available range)      PLAN  [x]  Continue Plan of Care  []  Upgrade Activities as Tolerated       [x]  Update Interventions per Flow Sheet, as Tolerated by Patient       []  Discharge Due To:   []  Other: Thank you for the referral to [facility]. Skyler Gill.  Jude, PT, DPT, ATR     12/7/2022     11:11 AM

## 2022-12-21 ENCOUNTER — HOSPITAL ENCOUNTER (OUTPATIENT)
Dept: PHYSICAL THERAPY | Age: 42
Discharge: HOME | End: 2022-12-21
Payer: MEDICAID

## 2022-12-21 PROCEDURE — 97530 THERAPEUTIC ACTIVITIES: CPT

## 2022-12-21 PROCEDURE — 97110 THERAPEUTIC EXERCISES: CPT

## 2022-12-21 PROCEDURE — 97535 SELF CARE MNGMENT TRAINING: CPT

## 2022-12-21 NOTE — PROGRESS NOTES
PT DAILY TREATMENT NOTE    Patient Name: Faiza Sanchez  Date:2022  : 1980  [x]  Patient  Verified  Payor: BLUE CROSS MEDICAID / Plan: [facility] / Product Type: Managed Care Medicaid /    In time:3:30  Out time:4:25  Total Treatment Time (min): 55  Total Timed Codes (min): 45  1:1 Treatment Time (MC/BCBS only): 45   Visit #: 2 of 16    Treatment Dx: Pain in right ankle and joints of right foot [M25.571]    SUBJECTIVE  Pain Level (0-10 scale): 1/10  Any medication changes, allergies to medications, adverse drug reactions, diagnosis change, or new procedure performed?: [x] No    [] Yes (see summary sheet for update)  Subjective functional status/changes:   [] No changes reported  \"I don't have too much pain right now. \"    OBJECTIVE    Modalities Rationale:     decrease edema, decrease inflammation and decrease pain to improve patient's ability to return to prior level of physical activity. 10 min [x]  Vasopneumatic Device, press/temp: Med/low   If using vaso (only need to measure limb vaso being performed on)      pre-treatment girth : 58 cm      post-treatment girth : 57 cm      measured at (landmark location) :  Med/Low   [x] Skin assessment post-treatment (if applicable):    [x]  intact    []  redness- no adverse reaction                  []redness - adverse reaction:          25 min Therapeutic Exercise:  [x] See flow sheet :   Rationale: increase ROM, increase strength, and improve coordination to improve the patients ability to return to prior level of physical activity. 10 min Therapeutic Activity:  [x]  See flow sheet :   Rationale: increase ROM, increase strength, and improve coordination  to improve the patients ability to return to prior level of physical activity.       10 min Self Care: Education with HEP performance and general Ankle mobility   Rationale:    increase ROM, increase strength, and improve coordination to improve the patients ability to return to prior level of physical activity. With   [] TE   [] TA   [] neuro   [] other: Patient Education: [x] Review HEP    [] Progressed/Changed HEP based on:   [] positioning   [] body mechanics   [] transfers   [] heat/ice application    [] other:      Other Objective/Functional Measures:    Access Code: [email or web address]  URL: [email or web address]  Date: 12/21/2022  Prepared by: Matthew Lu    Exercises  Standing Heel Raise - 1 x daily - 7 x weekly - 2 sets - 10 reps  Heel Toe Raises with Counter Support - 1 x daily - 7 x weekly - 2 sets - 10 reps  Seated Ankle Alphabet - 1 x daily - 7 x weekly - 2 sets - 10 reps  Seated Toe Towel Scrunches - 1 x daily - 7 x weekly - 3 sets - 10 reps  Seated Arch Lifts - 1 x daily - 7 x weekly - 3 sets - 10 reps  Seated Great Toe Extension - 1 x daily - 7 x weekly - 3 sets - 10 reps  Seated Lesser Toes Extension - 1 x daily - 7 x weekly - 3 sets - 10 reps      Pain Level (0-10 scale) post treatment: 1/10    ASSESSMENT/Changes in Function: Pt arrived in clinic with little to no pain in ankle at this time. Pt reports having no exercises for HEP performance at this time. Pt was able to tolerate stretching and mobility therex with no increased pain. Pt reported increased fatigue with therex but, not above tolerance. Pt was given updated HEP for ankle mobility performance with no increased pain. Patient will continue to benefit from skilled PT services to modify and progress therapeutic interventions, address functional mobility deficits, address ROM deficits, address strength deficits, analyze and address soft tissue restrictions, analyze and cue movement patterns, and analyze and modify body mechanics/ergonomics to attain remaining goals.      [x]  See Plan of Care  []  See progress note/recertification  []  See Discharge Summary         Progress towards goals / Updated goals:  Short Term Goals:    to be accomplished within 8 treatments:     Patient will be compliant and independent with prescribed HEP(s) in order to assist in maximizing therapeutic gains and improving overall function. Eval: HEP to be issued and reviewed with patient within 1-2 visits    Current: Distributed on visit 2 12/21/22    Patient will report at least a 25% reduction in pain/symptoms while performing ADLs/functional activities in order to improve overall tolerance to functional movements and progress towards PLOF. Eval: pain ranges from 5-10/10; currently 3/10     Patient will be able to ambulate with normal gait mechanics in order to improve overall mobility and return patient to PLOF. Eval: WBOS, antalgic including lacking heel strike and decreased push off with medial heel whip, mildly slow gait speed     Long Term Goals:     to be accomplished within 16 treatments:     Patient will score at least 62 points on FOTO in order to maximize function and promote patient satisfaction with overall outcome. (Cassie Zi is an established functional score where a score of 100 = no disability)  Eval: 51     Patient will report less than or equal to 2/10 pain during all functional activities/ADLs in order to improve QOL and return to patient's PLOF. Eval: pain ranges from 5-10/10; currently 3/10     Patient will increase active dorsiflexion to at least 10 degrees in order to improve toe clearance during ambulation and to improve calf flexibility. Eval: active DF to 2 degs     Patient will demonstrate at least 5/5 strength bilaterally in order to be able to safely perform functional activities and demonstrate improved stability and strength.   Eval: grossly 5/5 except right ankle INV/EV 4-/5 (within available range)    PLAN  [x]  Upgrade activities as tolerated     [x]  Continue plan of care  []  Update interventions per flow sheet       []  Discharge due to:_  []  Other:_      Maria Hurtado, PTA 12/21/2022  11:32 AM    Future Appointments   Date Time Provider Cricket Garvey   12/21/2022  3:30 PM Bernardo Burgess [facility]   12/23/2022  2:45 PM [facility]cy Roque [facility]   12/28/2022  3:30 PM Chen Laughlin, PT [facility]   12/30/2022  9:30 AM Santos Sol, PT [facility]

## 2022-12-23 ENCOUNTER — HOSPITAL ENCOUNTER (OUTPATIENT)
Dept: PHYSICAL THERAPY | Age: 42
Discharge: HOME | End: 2022-12-23
Payer: MEDICAID

## 2022-12-23 PROCEDURE — 97110 THERAPEUTIC EXERCISES: CPT

## 2022-12-23 PROCEDURE — 97112 NEUROMUSCULAR REEDUCATION: CPT

## 2022-12-23 PROCEDURE — 97530 THERAPEUTIC ACTIVITIES: CPT

## 2022-12-23 NOTE — PROGRESS NOTES
PT DAILY TREATMENT NOTE    Patient Name: Chasity Zuniga  Date: 2022  : 1980  [x]  Patient  Verified  Payor: BLUE CROSS MEDICAID / Plan: [facility] / Product Type: Managed Care Medicaid /    In Time: 2:37  Out Time: 3:23  Total Treatment Time (min): 46  Total Timed Codes (min): 46  1:1 Treatment Time ( [address] only): 46   Visit #: 3/16    Treatment Dx: Pain in right ankle and joints of right foot [M25.571]    SUBJECTIVE  Pre-Treatment Pain Level (0-10 scale): 0/10    Any medication changes, allergies to medications, adverse drug reactions, diagnosis change, or new procedure performed?: [x] No    [] Yes (see summary sheet for update)    Subjective Functional Status/Changes:  [] No changes reported  Patient reports no pain today, though she did have some minor \"discomfort\" in her right ankle last night when walking around.     OBJECTIVE    24 min Therapeutic Exercise:  [x] See flow sheet   Rationale: increase ROM, increase strength, and decrease pain to assist in improving patient's ability to perform functional activities and ADLs    12 min Therapeutic Activity:  [x] See flow sheet   Rationale: increase ROM, increase strength, and improve coordination to assist in improving patient's ability to perform functional activities and ADLs    10 min Neuromuscular Re-Education:  [x] See flow sheet   Rationale: improve coordination, improve balance/proprioception, improve posture, and improve core stabilization to assist in improving patient's ability to perform functional activities and ADLs as well as to improve core stabilization during static/dynamic movements      With   [x] TE   [] TA   [] neuro   [] other: Patient Education: [x] Review HEP    [] Progressed/Changed HEP based on:   [] positioning   [] body mechanics   [] transfers   [] heat/ice application    [] other:      Other Objective/Functional Measures: see goals below     Pain Level (0-10 scale) Post Treatment: 0/10    ASSESSMENT/Changes in Function:  Patient responded well to today's treatment without any adverse reactions. Educated patient on gastroc and soleus stretching using the wall today, so that she can use these stretches at home to assist in improving calf flexibility. Initiated patient on hip/gluteal and core stabilization exercises today, including sidelying hip abductions and bridges. Patient challenged with sidelying hip abductions secondary to weak bilateral glute meds, but did respond well to verbal cueing in order to reduce compensatory techniques. Patient will continue to benefit from skilled PT services to further modify and progress therapeutic interventions, address functional mobility deficits, address ROM deficits, address strength deficits, analyze and address soft tissue restrictions, analyze and cue movement patterns, analyze and modify body mechanics/ergonomics, assess and modify postural abnormalities, and instruct in home and community integration to attain remaining goals. [x]  See Plan of Care  []  See Progress Note/Recertification  []  See Discharge Summary         Progress Towards Goals/Updated Goals:    Short Term Goals:    to be accomplished within 8 treatments:     Patient will be compliant and independent with prescribed HEP(s) in order to assist in maximizing therapeutic gains and improving overall function. Eval: HEP to be issued and reviewed with patient within 1-2 visits               Current: Patient reports daily compliance with her HEP.    12/23/22, met     Patient will report at least a 25% reduction in pain/symptoms while performing ADLs/functional activities in order to improve overall tolerance to functional movements and progress towards PLOF. Eval: pain ranges from 5-10/10; currently 3/10     Patient will be able to ambulate with normal gait mechanics in order to improve overall mobility and return patient to PLOF.   Eval: WBOS, antalgic including lacking heel strike and decreased push off with medial heel whip, mildly slow gait speed     Long Term Goals:     to be accomplished within 16 treatments:     Patient will score at least 62 points on FOTO in order to maximize function and promote patient satisfaction with overall outcome. (Fatimah Liner is an established functional score where a score of 100 = no disability)  Eval: 51     Patient will report less than or equal to 2/10 pain during all functional activities/ADLs in order to improve QOL and return to patient's PLOF. Eval: pain ranges from 5-10/10; currently 3/10     Patient will increase active dorsiflexion to at least 10 degrees in order to improve toe clearance during ambulation and to improve calf flexibility. Eval: active DF to 2 degs     Patient will demonstrate at least 5/5 strength bilaterally in order to be able to safely perform functional activities and demonstrate improved stability and strength. Eval: grossly 5/5 except right ankle INV/EV 4-/5 (within available range)    PLAN  []  Upgrade Activities as Tolerated     [x]  Continue Plan of Care  []  Update Interventions per Flow Sheet       []  Discharge Due To:_  []  Other:_      Skyler Gill.  Jude, PT, DPT, ATR  12/23/2022 10:20 AM    Future Appointments   Date Time Provider Cricket Garvey   12/23/2022  2:45 PM Casi Crow [facility]   12/28/2022  3:30 PM Ramila Vieira PT [facility]   12/30/2022  9:30 AM Jacque Kwon PT [facility]

## 2022-12-28 ENCOUNTER — HOSPITAL ENCOUNTER (OUTPATIENT)
Dept: PHYSICAL THERAPY | Age: 42
Discharge: HOME | End: 2022-12-28
Payer: MEDICAID

## 2022-12-28 PROCEDURE — 97530 THERAPEUTIC ACTIVITIES: CPT

## 2022-12-28 PROCEDURE — 97110 THERAPEUTIC EXERCISES: CPT

## 2022-12-28 PROCEDURE — 97016 VASOPNEUMATIC DEVICE THERAPY: CPT

## 2022-12-28 PROCEDURE — 97112 NEUROMUSCULAR REEDUCATION: CPT

## 2022-12-28 NOTE — PROGRESS NOTES
PT DAILY TREATMENT NOTE    Patient Name: David Perez  Date:2022  : 1980  [x]  Patient  Verified  Payor: BLUE CROSS MEDICAID / Plan: [facility]led[facility] / Product Type: Managed Care Medicaid /    In time:255  Out time:351  Total Treatment Time (min): 56  Total Timed Codes (min): 46  1:1 Treatment Time (MC/BCBS only): 46  Visit #: 4 of 16    Treatment Dx: Pain in right ankle and joints of right foot [M25.571]    SUBJECTIVE  Pain Level (0-10 scale): 0/10  Any medication changes, allergies to medications, adverse drug reactions, diagnosis change, or new procedure performed?: [x] No    [] Yes (see summary sheet for update)  Subjective functional status/changes:   [] No changes reported  Patient reports no pain today but had pain and swelling yesterday that she attributes to being on her feet too long over the weekend. Patient reports starting a new job at [employer] on 1/10/2023.       OBJECTIVE    Modalities Rationale:     decrease edema, decrease inflammation, and decrease pain to improve patient's ability to ambulate with better mechanics   min [] Estim, type/location:                                      []  att     []  unatt     []  w/US     []  w/ice    []  w/heat    min []  Mechanical Traction: type/lbs                   []  pro   []  sup   []  int   []  cont    []  before manual    []  after manual    min []  Ultrasound, settings/location:      min []  Iontophoresis w/ dexamethasone, location:                                               []  take home patch       []  in clinic    min []  Ice     []  Heat    location/position:    10 min []  Vasopneumatic Device, press/temp: Low : 34   If using vaso (only need to measure limb vaso being performed on)      pre-treatment girth : 57 cm      post-treatment girth : 56.7 CM      measured at (landmark location) :  figure 8 right ankle    min []  Other:    [] Skin assessment post-treatment (if applicable):    []  intact []  redness- no adverse reaction                  []redness - adverse reaction:            18 min Therapeutic Exercise:  [] See flow sheet :   Rationale: increase ROM, increase strength, and improve coordination to improve the patients ability to ambulate with better mechanics     8 min Therapeutic Activity:  [x]  See flow sheet :   Rationale: improve coordination and improve balance  to improve the patients ability to negotiate steps with increased ease     20 min Neuromuscular Re-education:  [x]  See flow sheet :   Rationale: increase ROM, increase strength, improve coordination, and improve balance  to improve the patients ability to ambulate at work without pain. With   [] TE   [] TA   [] neuro   [] other: Patient Education: [x] Review HEP    [] Progressed/Changed HEP based on:   [] positioning   [] body mechanics   [] transfers   [] heat/ice application    [] other:             Pain Level (0-10 scale) post treatment: 0/10    ASSESSMENT/Changes in Function: Patient tolerated treatment session well today. Patient had no complaints with addition of resistance exercises of 4\" step tap downs, dorsiflexion/plantar flexion/eversion/inversion and 1/2 kneeling rock backs to exercise program to accomplish increase strength of ankle musculature and stretching of the plantar fascia. Patient continues to make steady progress toward goals and would benefit from continued skilled PT intervention to address remaining deficits outlined in goals below. Patient will continue to benefit from skilled PT services to modify and progress therapeutic interventions, address functional mobility deficits, address ROM deficits, address strength deficits, analyze and address soft tissue restrictions, analyze and cue movement patterns, analyze and modify body mechanics/ergonomics, and assess and modify postural abnormalities to attain remaining goals.      [x]  See Plan of Care  []  See progress note/recertification  []  See Discharge Summary         Progress towards goals / Updated goals:  Short Term Goals:    to be accomplished within 8 treatments:     Patient will be compliant and independent with prescribed HEP(s) in order to assist in maximizing therapeutic gains and improving overall function. Eval: HEP to be issued and reviewed with patient within 1-2 visits               Current: Patient reports daily compliance with her HEP.    12/23/22, met     Patient will report at least a 25% reduction in pain/symptoms while performing ADLs/functional activities in order to improve overall tolerance to functional movements and progress towards PLOF. Eval: pain ranges from 5-10/10; currently 3/10  Current:  pain ranges 0-4/10. Progressing 12/28/2022     Patient will be able to ambulate with normal gait mechanics in order to improve overall mobility and return patient to PLOF. Eval: WBOS, antalgic including lacking heel strike and decreased push off with medial heel whip, mildly slow gait speed     Long Term Goals:     to be accomplished within 16 treatments:     Patient will score at least 62 points on FOTO in order to maximize function and promote patient satisfaction with overall outcome. (Rosezena German is an established functional score where a score of 100 = no disability)  Eval: 51     Patient will report less than or equal to 2/10 pain during all functional activities/ADLs in order to improve QOL and return to patient's PLOF. Eval: pain ranges from 5-10/10; currently 3/10     Patient will increase active dorsiflexion to at least 10 degrees in order to improve toe clearance during ambulation and to improve calf flexibility. Eval: active DF to 2 degs     Patient will demonstrate at least 5/5 strength bilaterally in order to be able to safely perform functional activities and demonstrate improved stability and strength.   Eval: grossly 5/5 except right ankle INV/EV 4-/5 (within available range)       PLAN  [] Upgrade activities as tolerated     [x]  Continue plan of care  []  Update interventions per flow sheet       []  Discharge due to:_  []  Other:_      Ozzy Young PT 12/28/2022  1:10 PM    Future Appointments   Date Time Provider Cricket Garvey   12/28/2022  3:30 PM Denny Steiner, [facility]   12/30/2022  9:30 AM Denny Steiner, PT [facility]

## 2022-12-30 ENCOUNTER — APPOINTMENT (OUTPATIENT)
Dept: PHYSICAL THERAPY | Age: 42
End: 2022-12-30
Payer: MEDICAID

## 2023-01-06 ENCOUNTER — HOSPITAL ENCOUNTER (OUTPATIENT)
Dept: PHYSICAL THERAPY | Age: 43
Discharge: HOME | End: 2023-01-06
Payer: MEDICAID

## 2023-01-06 PROCEDURE — 97530 THERAPEUTIC ACTIVITIES: CPT

## 2023-01-06 PROCEDURE — 97110 THERAPEUTIC EXERCISES: CPT

## 2023-01-06 NOTE — PROGRESS NOTES
[facility] at [address] Angela [place], [address]  Ph [phone]  Fx [phone]    Physical Therapy Progress Note  [facility]   Referral source: Beula Cockayne., PA : 1980   Medical/Treatment Diagnosis: Pain in right ankle and joints of right foot [M25.571] Onset Date:10/1/2022   Prior Hospitalization: see medical history Provider#: [id number]   Medications: Verified on Patient Summary List    Comorbidities: chronic right foot pain since 2022, s/p left Achilles tightening and calcaneal bone spur removal 2021, depression, OA, chronic LBP with upcoming ablation, hypertension, hypothyroid, obesity, restless leg syndrome, \"heart palpitations\"  Substance Use: [] tobacco use, [] alcohol use, [] other:   Prior Level of Function: working full-time as  at [employer], mother of 3 active kids  [x] Unrestricted with functional activities and ADLs  [x] No assistive device  [x] active lifestyle, [] moderately active lifestyle, [] sedentary lifestyle    Visits from Start of Care: 5    Missed Visits: 0    Goals/Measure of Progress:    Short Term Goals:    to be accomplished within 8 treatments:     Patient will be compliant and independent with prescribed HEP(s) in order to assist in maximizing therapeutic gains and improving overall function. Eval: HEP to be issued and reviewed with patient within 1-2 visits               Current: Patient reports daily compliance with her HEP.    22, met     Patient will report at least a 25% reduction in pain/symptoms while performing ADLs/functional activities in order to improve overall tolerance to functional movements and progress towards PLOF.   Eval: pain ranges from 5-10/10; currently 3/10  Current:  3/10 at worst in the past week 23, met     Patient will be able to ambulate with normal gait mechanics in order to improve overall mobility and return patient to PLOF.  Eval: WBOS, antalgic including lacking heel strike and decreased push off with medial heel whip, mildly slow gait speed               Current: Continued WBOS with antalgic weight bearing but bilateral heel strike present as well as equal bilateral step length but decreased push of with mild toe out compensation 1/6/23     Long Term Goals:     to be accomplished within 16 treatments:     Patient will score at least 62 points on FOTO in order to maximize function and promote patient satisfaction with overall outcome. (Sun David is an established functional score where a score of 100 = no disability)  Eval: 51               Current: 72 1/6/23 MET     Patient will report less than or equal to 2/10 pain during all functional activities/ADLs in order to improve QOL and return to patient's PLOF. Eval: pain ranges from 5-10/10; currently 3/10               Current: 3/10 at worst in the past week 1/6/23, progressing     Patient will increase active dorsiflexion to at least 10 degrees in order to improve toe clearance during ambulation and to improve calf flexibility. Eval: active DF to 2 degs  Current: DF to 6 deg 1/6/23, progressing     Patient will demonstrate at least 5/5 strength bilaterally in order to be able to safely perform functional activities and demonstrate improved stability and strength. Eval: grossly 5/5 except right ankle INV/EV 4-/5 (within available range)               Current: Grossly 5/5 Right ankle except for EV 4/5 1/6/23, progressing    Key Functional Changes: Pt arrived 13 minutes late to appointment. Pt has demonstrated improvements with Right ankle rehab. Pt demonstrates improved ROM with DF as well as improved general strength with Right ankle. Pt does continue to demonstrated moderate weakness with Eversion at this time. Pt demonstrates improved gait mechanics with improved heel strike and equal step length bilaterally but, pt demonstrates antalgic gait with general toe out compensation. Pt also achieved and improved FOTO score this visit. Pt has made good progress towards goals established at [facility]. Pt will benefit from continued therapy to address above deficits and to return to prior level of activity.        ASSESSMENT/RECOMMENDATIONS:  [x]Continue therapy per initial plan/protocol at a frequency of  2 x per week for 12 treatments   []Continue therapy with the following recommended changes:_____________________ _____________________________ ________________________________________  []Discontinue therapy progressing towards or have reached established goals  []Discontinue therapy due to lack of appreciable progress towards goals  []Discontinue therapy due to lack of attendance or compliance  []Await Physician's recommendations/decisions regarding therapy  []Other:________________________________________________________________    Thank you for this referral.   Lori Moore PTA 1/6/2023 8:23 AM

## 2023-01-06 NOTE — PROGRESS NOTES
PT DAILY TREATMENT NOTE    Patient Name: Alina Mccray  Date:2023  : 1980  [x]  Patient  Verified  Payor: BLUE CROSS MEDICAID / Plan: [facility] HEALTHKEEPERS PLUS / Product Type: Managed Care Medicaid /    In time:10:13  Out time:10:36  Total Treatment Time (min): 23  Total Timed Codes (min): 23  1:1 Treatment Time (MC/BCBS only): 23  Visit #: 5 of 16    Treatment Dx: Pain in right ankle and joints of right foot [M25.571]    SUBJECTIVE  Pain Level (0-10 scale): 3/10  Any medication changes, allergies to medications, adverse drug reactions, diagnosis change, or new procedure performed?: [x] No    [] Yes (see summary sheet for update)  Subjective functional status/changes:   [] No changes reported  \"I have some pain in my ankle. \"    OBJECTIVE    13 min Therapeutic Exercise:  [x] See flow sheet :   Rationale: increase ROM, increase strength, and improve coordination to improve the patients ability to return to prior level of physical activity. 10 min Therapeutic Activity:  [x]  See flow sheet :   Rationale: increase ROM, increase strength, and improve coordination  to improve the patients ability to return to prior level of physical activity. With   [] TE   [] TA   [] neuro   [] other: Patient Education: [x] Review HEP    [] Progressed/Changed HEP based on:   [] positioning   [] body mechanics   [] transfers   [] heat/ice application    [] other:      Other Objective/Functional Measures:     Pain Level (0-10 scale) post treatment: 0/10    ASSESSMENT/Changes in Function: Pt arrived 13 minutes late to appointment. Pt has demonstrated improvements with Right ankle rehab. Pt demonstrates improved ROM with DF as well as improved general strength with Right ankle. Pt does continue to demonstrated moderate weakness with Eversion at this time.  Pt demonstrates improved gait mechanics with improved heel strike and equal step length bilaterally but, pt demonstrates antalgic gait with general toe out compensation. Pt also achieved and improved FOTO score this visit. Pt has made good progress towards goals established at eval. Pt will benefit from continued therapy to address above deficits and to return to prior level of activity. Patient will continue to benefit from skilled PT services to modify and progress therapeutic interventions, address functional mobility deficits, address ROM deficits, address strength deficits, analyze and address soft tissue restrictions, analyze and cue movement patterns, and analyze and modify body mechanics/ergonomics to attain remaining goals. [x]  See Plan of Care  []  See progress note/recertification  []  See Discharge Summary         Progress towards goals / Updated goals:  Short Term Goals:    to be accomplished within 8 treatments:     Patient will be compliant and independent with prescribed HEP(s) in order to assist in maximizing therapeutic gains and improving overall function. Eval: HEP to be issued and reviewed with patient within 1-2 visits               Current: Patient reports daily compliance with her HEP.    12/23/22, met     Patient will report at least a 25% reduction in pain/symptoms while performing ADLs/functional activities in order to improve overall tolerance to functional movements and progress towards PLOF. Eval: pain ranges from 5-10/10; currently 3/10  Current:  3/10 at worst in the past week 1/6/23     Patient will be able to ambulate with normal gait mechanics in order to improve overall mobility and return patient to PLOF.   Eval: WBOS, antalgic including lacking heel strike and decreased push off with medial heel whip, mildly slow gait speed    Current:Continued WBOS with antalgic weight bearing but, bilateral heel strike present as well as equal bilateral step length but decreased push of with mild toe out compensation 1/6/23    Long Term Goals:     to be accomplished within 16 treatments:     Patient will score at least 62 points on FOTO in order to maximize function and promote patient satisfaction with overall outcome. (Jennifer Hasten is an established functional score where a score of 100 = no disability)  Eval: 51    Current: 72 1/6/23 MET    Patient will report less than or equal to 2/10 pain during all functional activities/ADLs in order to improve QOL and return to patient's PLOF. Eval: pain ranges from 5-10/10; currently 3/10    Current: 3/10 at worst in the past week 1/6/23    Patient will increase active dorsiflexion to at least 10 degrees in order to improve toe clearance during ambulation and to improve calf flexibility. Eval: active DF to 2 degs  Current: DF to 6 deg 1/6/23     Patient will demonstrate at least 5/5 strength bilaterally in order to be able to safely perform functional activities and demonstrate improved stability and strength.   Eval: grossly 5/5 except right ankle INV/EV 4-/5 (within available range)    Current: Grossly 5/5 Right ankle except for EV 4/5 1/6/23       PLAN  [x]  Upgrade activities as tolerated     [x]  Continue plan of care  []  Update interventions per flow sheet       []  Discharge due to:_  []  Other:_      Aydee Whitehead PTA 1/6/2023  8:18 AM    Future Appointments   Date Time Provider Cricket Garvey   1/6/2023 10:00 AM Jadon Hernandez [facility]   1/9/2023  6:00 PM [facility]   1/11/2023  5:00 PM [facility]   1/16/2023  5:30 PM [facility]   1/18/2023  5:30 PM [facility]   1/23/2023  5:30 PM [facility]   1/25/2023  5:30 PM [facility]

## 2023-01-09 ENCOUNTER — HOSPITAL ENCOUNTER (OUTPATIENT)
Dept: PHYSICAL THERAPY | Age: 43
Discharge: HOME | End: 2023-01-09
Payer: MEDICAID

## 2023-01-09 PROCEDURE — 97530 THERAPEUTIC ACTIVITIES: CPT

## 2023-01-09 PROCEDURE — 97116 GAIT TRAINING THERAPY: CPT

## 2023-01-09 PROCEDURE — 97110 THERAPEUTIC EXERCISES: CPT

## 2023-01-09 PROCEDURE — 97112 NEUROMUSCULAR REEDUCATION: CPT

## 2023-01-09 NOTE — PROGRESS NOTES
PT DAILY TREATMENT NOTE    Patient Name: Zion Chaidez  Date:2023  : 1980  [x]  Patient  Verified  Payor: BLUE CROSS MEDICAID / Plan: [facility] HEALTHKEEPERS PLUS / Product Type: Managed Care Medicaid /    In time:557  Out time:650  Total Treatment Time (min): 53  Total Timed Codes (min): 53  1:1 Treatment Time (MC/BCBS only): 48   Visit #: 6 of 16    Treatment Dx: Pain in right ankle and joints of right foot [M25.571]    SUBJECTIVE  Pain Level (0-10 scale): 2/10  Any medication changes, allergies to medications, adverse drug reactions, diagnosis change, or new procedure performed?: [x] No    [] Yes (see summary sheet for update)  Subjective functional status/changes:   [] No changes reported  Patient reports her starting her new job tomorrow. OBJECTIVE          20 min Therapeutic Exercise:  [x] See flow sheet :   Rationale: increase strength, improve coordination, and improve balance to improve the patients ability to return to PLOF    10 min Therapeutic Activity:  [x]  See flow sheet :   Rationale: increase ROM, increase strength, and improve coordination  to improve the patients ability to perform work task      15 min Neuromuscular Re-education:  [x]  See flow sheet :   Rationale: increase ROM, increase strength, and improve coordination  to improve the patients ability to prevent injury        8 min Gait Trainin___ feet . Patient demos decreased stride length, good heel strike, ER of bilateral feet (decreased hallux extension)   Rationale: To improve ambulation safety and efficiency in order to improve patient's ability to safely ambulate at home for self care.                With   [] TE   [] TA   [] neuro   [] other: Patient Education: [x] Review HEP    [] Progressed/Changed HEP based on:   [] positioning   [] body mechanics   [] transfers   [] heat/ice application    [] other:          Pain Level (0-10 scale) post treatment: 0/10    ASSESSMENT/Changes in Function: Patient tolerated treatment session well today. Patient had no complaints with addition of single leg and tandem stance to exercise program to accomplish increased balance and . Patient continues to make steady progress toward goals and would benefit from continued skilled PT intervention to address remaining deficits outlined in goals below. Patient will continue to benefit from skilled PT services to modify and progress therapeutic interventions, address functional mobility deficits, address ROM deficits, address strength deficits, analyze and address soft tissue restrictions, analyze and cue movement patterns, and analyze and modify body mechanics/ergonomics to attain remaining goals. [x]  See Plan of Care  []  See progress note/recertification  []  See Discharge Summary         Progress towards goals / Updated goals:  Short Term Goals:    to be accomplished within 8 treatments:     Patient will be compliant and independent with prescribed HEP(s) in order to assist in maximizing therapeutic gains and improving overall function. Eval: HEP to be issued and reviewed with patient within 1-2 visits               Current: Patient reports daily compliance with her HEP.    12/23/22, met     Patient will report at least a 25% reduction in pain/symptoms while performing ADLs/functional activities in order to improve overall tolerance to functional movements and progress towards PLOF. Eval: pain ranges from 5-10/10; currently 3/10  Current:  3/10 at worst in the past week 1/6/23, met     Patient will be able to ambulate with normal gait mechanics in order to improve overall mobility and return patient to PLOF.   Eval: WBOS, antalgic including lacking heel strike and decreased push off with medial heel whip, mildly slow gait speed               Current: Continued WBOS with antalgic weight bearing but bilateral heel strike present as well as equal bilateral step length but decreased push of with mild toe out compensation 1/6/23               Current:   Patient ambulated ~ 330'. Patient demos decreased stride length but bilateral foot passes stance foot and clears the floor. Patient demos decreased jose and mild toe out. 1/9/2023     Long Term Goals:     to be accomplished within 16 treatments:     Patient will score at least 62 points on FOTO in order to maximize function and promote patient satisfaction with overall outcome. (Juancarlos Forest is an established functional score where a score of 100 = no disability)  Eval: 51               Current: 72 1/6/23 MET     Patient will report less than or equal to 2/10 pain during all functional activities/ADLs in order to improve QOL and return to patient's PLOF. Eval: pain ranges from 5-10/10; currently 3/10               Current: 3/10 at worst in the past week 1/6/23, progressing     Patient will increase active dorsiflexion to at least 10 degrees in order to improve toe clearance during ambulation and to improve calf flexibility. Eval: active DF to 2 degs  Current: DF to 6 deg 1/6/23, progressing     Patient will demonstrate at least 5/5 strength bilaterally in order to be able to safely perform functional activities and demonstrate improved stability and strength.   Eval: grossly 5/5 except right ankle INV/EV 4-/5 (within available range)               Current: Grossly 5/5 Right ankle except for EV 4/5 1/6/23, progressing       PLAN  []  Upgrade activities as tolerated     [x]  Continue plan of care  []  Update interventions per flow sheet       []  Discharge due to:_  []  Other:_      Balwinder Dobbs, PT 1/9/2023  2:33 PM    Future Appointments   Date Time Provider Cricket Garvey   1/9/2023  6:00 PM Lora Ruelas, PT [facility]   1/11/2023  5:00 PM [facility]   1/16/2023  5:30 PM [facility]   1/18/2023  5:30 PM [facility]   1/23/2023  5:30 PM [facility]   1/25/2023  5:30 PM Mary Block, Hannah Black [facility]

## 2023-01-11 ENCOUNTER — HOSPITAL ENCOUNTER (OUTPATIENT)
Dept: PHYSICAL THERAPY | Age: 43
Discharge: HOME | End: 2023-01-11
Payer: MEDICAID

## 2023-01-11 PROCEDURE — 97530 THERAPEUTIC ACTIVITIES: CPT

## 2023-01-11 PROCEDURE — 97016 VASOPNEUMATIC DEVICE THERAPY: CPT

## 2023-01-11 PROCEDURE — 97110 THERAPEUTIC EXERCISES: CPT

## 2023-01-11 NOTE — PROGRESS NOTES
PT DAILY TREATMENT NOTE    Patient Name: Poli Al  Date:2023  : 1980  [x]  Patient  Verified  Payor: BLUE CROSS MEDICAID / Plan: [facility] HEALTHKEEPERS PLUS / Product Type: Managed Care Medicaid /    In time:5:01  Out time:5:45  Total Treatment Time (min): 44  Total Timed Codes (min): 34  1:1 Treatment Time (MC/BCBS only): 30   Visit #: 7 of 16    Treatment Dx: Pain in right ankle and joints of right foot [M25.571]    SUBJECTIVE  Pain Level (0-10 scale): 0/10  Any medication changes, allergies to medications, adverse drug reactions, diagnosis change, or new procedure performed?: [x] No    [] Yes (see summary sheet for update)  Subjective functional status/changes:   [x] No changes reported  \"I don't have any pain right now. \"    OBJECTIVE    Modalities Rationale:     decrease edema, decrease inflammation and decrease pain to improve patient's ability to return to prior level of physical activity. 10 min [x]  Vasopneumatic Device, press/temp: Med/Low   If using vaso (only need to measure limb vaso being performed on)      pre-treatment girth : 59 cm      post-treatment girth : 57 cm      measured at (landmark location) :  Figure 8   [x] Skin assessment post-treatment (if applicable):    [x]  intact    []  redness- no adverse reaction                  []redness - adverse reaction:              24 min Therapeutic Exercise:  [x] See flow sheet :   Rationale: increase ROM, increase strength, and improve coordination to improve the patients ability to return to prior level of physical activity. 10 min Therapeutic Activity:  [x]  See flow sheet :   Rationale: increase ROM, increase strength, and improve coordination  to improve the patients ability to return to prior level of physical activity.             With   [] TE   [] TA   [] neuro   [] other: Patient Education: [x] Review HEP    [] Progressed/Changed HEP based on:   [] positioning   [] body mechanics   [] transfers   [] heat/ice application    [] other:      Other Objective/Functional Measures:      Pain Level (0-10 scale) post treatment: 0/10    ASSESSMENT/Changes in Function: Pt arrived in clinic reporting no increased pain in ankle at this time. Pt was progressed to standing step down heel tap control as well as airex NBOS balance activities in order to continue to improved general ankle stability. Pt tolerate therex well with no increased pain. Pt demonstrated weakness with resisted ankle 4 way, requiring VC to avoid uncontrolled release during each direction. Pt received VASO post tx for reduced pain and swelling. Patient will continue to benefit from skilled PT services to modify and progress therapeutic interventions, address functional mobility deficits, address ROM deficits, address strength deficits, analyze and address soft tissue restrictions, analyze and cue movement patterns, and analyze and modify body mechanics/ergonomics to attain remaining goals. [x]  See Plan of Care  []  See progress note/recertification  []  See Discharge Summary         Progress towards goals / Updated goals:  Short Term Goals:    to be accomplished within 8 treatments:     Patient will be compliant and independent with prescribed HEP(s) in order to assist in maximizing therapeutic gains and improving overall function. Eval: HEP to be issued and reviewed with patient within 1-2 visits               Current: Patient reports daily compliance with her HEP.    12/23/22, met     Patient will report at least a 25% reduction in pain/symptoms while performing ADLs/functional activities in order to improve overall tolerance to functional movements and progress towards PLOF. Eval: pain ranges from 5-10/10; currently 3/10  Current:  3/10 at worst in the past week 1/6/23, met     Patient will be able to ambulate with normal gait mechanics in order to improve overall mobility and return patient to PLOF.   Eval: WBOS, antalgic including lacking heel strike and decreased push off with medial heel whip, mildly slow gait speed               1/11/23 PN: Continued WBOS with antalgic weight bearing but bilateral heel strike present as well as equal bilateral step length but decreased push of with mild toe out compensation 1/6/23               Current:   Patient ambulated ~ 330'. Patient demos decreased stride length but bilateral foot passes stance foot and clears the floor. Patient demos decreased jose and mild toe out. 1/9/2023     Long Term Goals:     to be accomplished within 16 treatments:     Patient will score at least 62 points on FOTO in order to maximize function and promote patient satisfaction with overall outcome. (Gayl Ser is an established functional score where a score of 100 = no disability)  Eval: 51               Current: 72 1/6/23 MET     Patient will report less than or equal to 2/10 pain during all functional activities/ADLs in order to improve QOL and return to patient's PLOF. Eval: pain ranges from 5-10/10; currently 3/10               1/6/23 PN: 3/10 at worst in the past week , progressing    Current:     Patient will increase active dorsiflexion to at least 10 degrees in order to improve toe clearance during ambulation and to improve calf flexibility. Eval: active DF to 2 degs  1/6/23 PN: DF to 6 deg  progressing    Current:     Patient will demonstrate at least 5/5 strength bilaterally in order to be able to safely perform functional activities and demonstrate improved stability and strength.   Eval: grossly 5/5 except right ankle INV/EV 4-/5 (within available range)              1/6/23 PN: Grossly 5/5 Right ankle except for EV 4/5  progressing    Current:     PLAN  [x]  Upgrade activities as tolerated     [x]  Continue plan of care  []  Update interventions per flow sheet       []  Discharge due to:_  []  Other:_      Zane Sherman, DEYA 1/11/2023  5:06 PM    Future Appointments   Date Time Provider Cricket Garvey   1/16/2023 5:30 PM [facility]   1/18/2023  5:30 PM [facility]   1/23/2023  5:30 PM [facility]   1/25/2023  5:30 PM [facility]

## 2023-01-16 ENCOUNTER — HOSPITAL ENCOUNTER (OUTPATIENT)
Dept: PHYSICAL THERAPY | Age: 43
Discharge: HOME | End: 2023-01-16
Payer: MEDICAID

## 2023-01-16 PROCEDURE — 97530 THERAPEUTIC ACTIVITIES: CPT

## 2023-01-16 PROCEDURE — 97016 VASOPNEUMATIC DEVICE THERAPY: CPT

## 2023-01-16 PROCEDURE — 97110 THERAPEUTIC EXERCISES: CPT

## 2023-01-16 NOTE — PROGRESS NOTES
PT DAILY TREATMENT NOTE    Patient Name: Ness Lundberg  Date:2023  : 1980  [x]  Patient  Verified  Payor: BLUE CROSS MEDICAID / Plan: [facility] HEALTHKEEPERS PLUS / Product Type: Managed Care Medicaid /    In time:5:24  Out time:6:14  Total Treatment Time (min): 50  Total Timed Codes (min): 40  1:1 Treatment Time (MC/BCBS only): 38   Visit #: 8 of 16    Treatment Dx: Pain in right ankle and joints of right foot [M25.571]    SUBJECTIVE  Pain Level (0-10 scale): 2/10  Any medication changes, allergies to medications, adverse drug reactions, diagnosis change, or new procedure performed?: [x] No    [] Yes (see summary sheet for update)  Subjective functional status/changes:   [] No changes reported  \"I have some pain in my foot when I'm standing on it. \"    OBJECTIVE    Modalities Rationale:     decrease edema, decrease inflammation and decrease pain to improve patient's ability to return to prior level of physical activity. 10 min [x]  Vasopneumatic Device, press/temp: Med/Low   If using vaso (only need to measure limb vaso being performed on)      pre-treatment girth : 59 cm      post-treatment girth : 57 cm      measured at (landmark location) :  Figure 8   [x] Skin assessment post-treatment (if applicable):    [x]  intact    []  redness- no adverse reaction                  []redness - adverse reaction:              25 min Therapeutic Exercise:  [x] See flow sheet :   Rationale: increase ROM, increase strength, and improve coordination to improve the patients ability to return to prior level of physical activity. 15 min Therapeutic Activity:  [x]  See flow sheet :   Rationale: increase ROM, increase strength, and improve coordination  to improve the patients ability to return to prior level of physical activity.         With   [] TE   [] TA   [] neuro   [] other: Patient Education: [x] Review HEP    [] Progressed/Changed HEP based on:   [] positioning   [] body mechanics   [] transfers   [] heat/ice application    [] other:      Other Objective/Functional Measures:      Pain Level (0-10 scale) post treatment: 0/10    ASSESSMENT/Changes in Function: Pt arrived in clinic reporting very mild pain in ankle at this time. Pt continues to demonstrate moderate difficulty with balance activities, having more difficulty with SLS. Pt reported increased fatigue with ankle 4 way with resistance band. Pt reported decreased pain post therex. Pt received VASO post tx for reduction of pain and swelling. Patient will continue to benefit from skilled PT services to modify and progress therapeutic interventions, address functional mobility deficits, address ROM deficits, address strength deficits, analyze and address soft tissue restrictions, analyze and cue movement patterns, and analyze and modify body mechanics/ergonomics to attain remaining goals. [x]  See Plan of Care  []  See progress note/recertification  []  See Discharge Summary         Progress towards goals / Updated goals:  Short Term Goals:    to be accomplished within 8 treatments:     Patient will be compliant and independent with prescribed HEP(s) in order to assist in maximizing therapeutic gains and improving overall function. Eval: HEP to be issued and reviewed with patient within 1-2 visits               Current: Patient reports daily compliance with her HEP.    12/23/22, met     Patient will report at least a 25% reduction in pain/symptoms while performing ADLs/functional activities in order to improve overall tolerance to functional movements and progress towards PLOF. Eval: pain ranges from 5-10/10; currently 3/10  Current:  3/10 at worst in the past week 1/6/23, met     Patient will be able to ambulate with normal gait mechanics in order to improve overall mobility and return patient to PLOF.   Eval: WBOS, antalgic including lacking heel strike and decreased push off with medial heel whip, mildly slow gait speed 1/11/23 PN: Continued WBOS with antalgic weight bearing but bilateral heel strike present as well as equal bilateral step length but decreased push of with mild toe out compensation 1/6/23               Current:   Patient ambulated ~ 330'. Patient demos decreased stride length but bilateral foot passes stance foot and clears the floor. Patient demos decreased jose and mild toe out. 1/9/2023     Long Term Goals:     to be accomplished within 16 treatments:     Patient will score at least 62 points on FOTO in order to maximize function and promote patient satisfaction with overall outcome. (Abran Duhamel is an established functional score where a score of 100 = no disability)  Eval: 51               Current: 72 1/6/23 MET     Patient will report less than or equal to 2/10 pain during all functional activities/ADLs in order to improve QOL and return to patient's PLOF. Eval: pain ranges from 5-10/10; currently 3/10               1/6/23 PN: 3/10 at worst in the past week , progressing               Current: 3/10 at worst in the past week 1/16/23     Patient will increase active dorsiflexion to at least 10 degrees in order to improve toe clearance during ambulation and to improve calf flexibility. Eval: active DF to 2 degs  1/6/23 PN: DF to 6 deg  progressing               Current:      Patient will demonstrate at least 5/5 strength bilaterally in order to be able to safely perform functional activities and demonstrate improved stability and strength.   Eval: grossly 5/5 except right ankle INV/EV 4-/5 (within available range)              1/6/23 PN: Grossly 5/5 Right ankle except for EV 4/5  progressing               Current:     PLAN  [x]  Upgrade activities as tolerated     [x]  Continue plan of care  []  Update interventions per flow sheet       []  Discharge due to:_  []  Other:_      Sen Begum, PTA 1/16/2023  3:51 PM    Future Appointments   Date Time Provider Cricket Garvey   1/16/2023  5:30 PM Danae Martinez [facility]   1/18/2023  5:30 PM [facility]   1/23/2023  5:30 PM [facility]   1/25/2023  5:30 PM [facility]

## 2023-01-18 ENCOUNTER — HOSPITAL ENCOUNTER (OUTPATIENT)
Dept: PHYSICAL THERAPY | Age: 43
Discharge: HOME | End: 2023-01-18
Payer: MEDICAID

## 2023-01-18 PROCEDURE — 97110 THERAPEUTIC EXERCISES: CPT

## 2023-01-18 PROCEDURE — 97530 THERAPEUTIC ACTIVITIES: CPT

## 2023-01-18 PROCEDURE — 97016 VASOPNEUMATIC DEVICE THERAPY: CPT

## 2023-01-18 PROCEDURE — 97112 NEUROMUSCULAR REEDUCATION: CPT

## 2023-01-18 NOTE — PROGRESS NOTES
PT DAILY TREATMENT NOTE    Patient Name: Unique Mendoza  Date:2023  : 1980  [x]  Patient  Verified  Payor: BLUE CROSS MEDICAID / Plan: [facility] HEALTHKEEPERS PLUS / Product Type: Managed Care Medicaid /    In time:5:04  Out time:6:02  Total Treatment Time (min): 58  Total Timed Codes (min): 48  1:1 Treatment Time (MC/BCBS only): 48   Visit #: 9 of 16    Treatment Dx: Pain in right ankle and joints of right foot [M25.571]    SUBJECTIVE  Pain Level (0-10 scale): 0/10  Any medication changes, allergies to medications, adverse drug reactions, diagnosis change, or new procedure performed?: [x] No    [] Yes (see summary sheet for update)  Subjective functional status/changes:   [] No changes reported  \"I don't have any pain right now but, my ankle is a little swollen. It swells up at the end of the day. \"    OBJECTIVE    Modalities Rationale:     decrease edema, decrease inflammation and decrease pain to improve patient's ability to return to prior level of physical activity. 10 min [x]  Vasopneumatic Device, press/temp: Med/low   If using vaso (only need to measure limb vaso being performed on)      pre-treatment girth : 62 cm      post-treatment girth : 59 cm      measured at (landmark location) :  Figure 8   [x] Skin assessment post-treatment (if applicable):    [x]  intact    []  redness- no adverse reaction                  []redness - adverse reaction:          23 min Therapeutic Exercise:  [x] See flow sheet :   Rationale: increase ROM, increase strength, and improve coordination to improve the patients ability to return to prior level of physical activity. 10 min Therapeutic Activity:  [x]  See flow sheet :   Rationale: increase ROM, increase strength, and improve coordination  to improve the patients ability to return to prior level of physical activity.       15 min Neuromuscular Re-education:  [x]  See flow sheet :   Rationale: increase ROM, increase strength, and improve coordination  to improve the patients ability to return to prior level of physical activity. With   [] TE   [] TA   [] neuro   [] other: Patient Education: [x] Review HEP    [] Progressed/Changed HEP based on:   [] positioning   [] body mechanics   [] transfers   [] heat/ice application    [] other:      Other Objective/Functional Measures:   Access Code: [email or web address]  URL: [email or web address]  Date: 01/18/2023  Prepared by: Charlene Sotelo    Exercises  Supine Bridge - 1 x daily - 7 x weekly - 2 sets - 10 reps  Clamshell with Resistance - 1 x daily - 7 x weekly - 2 sets - 10 reps  Hip Abduction with Resistance Loop - 1 x daily - 7 x weekly - 2 sets - 10 reps  Standing Hip Flexion with Resistance Loop - 1 x daily - 7 x weekly - 2 sets - 10 reps  Hip Extension with Resistance Loop - 1 x daily - 7 x weekly - 2 sets - 10 reps  Mini Squat with Chair - 1 x daily - 7 x weekly - 2 sets - 10 reps  Sidelying Hip Abduction - 1 x daily - 7 x weekly - 2 sets - 10 reps  Side Stepping with Resistance at Ankles - 1 x daily - 7 x weekly - 2 sets - 1 reps  Tandem Stance with Support - 1 x daily - 7 x weekly - 2 sets - 10 reps  Single Leg Stance with Support - 1 x daily - 7 x weekly - 2 sets - 10 reps  Ankle and Toe Plantarflexion with Resistance - 1 x daily - 7 x weekly - 2 sets - 10 reps  Ankle Dorsiflexion with Resistance - 1 x daily - 7 x weekly - 2 sets - 10 reps  Ankle Eversion with Resistance - 1 x daily - 7 x weekly - 2 sets - 10 reps  Ankle Eversion with Resistance - 1 x daily - 7 x weekly - 2 sets - 10 reps       Pain Level (0-10 scale) post treatment: 0/10    ASSESSMENT/Changes in Function: Pt arrived in clinic reporting no pain in ankle at this time. Pt continues to be challenged by balance activities but, not above tolerance. Pt demonstrates good stability without UE support on rocker board and no increased pain.  Pt was given updated HEP for continued progression at home for maximum benefit from therapy. Pt received VASO post tx for reduction of pain and swelling. Patient will continue to benefit from skilled PT services to modify and progress therapeutic interventions, address functional mobility deficits, address ROM deficits, address strength deficits, analyze and address soft tissue restrictions, analyze and cue movement patterns, and analyze and modify body mechanics/ergonomics to attain remaining goals. [x]  See Plan of Care  []  See progress note/recertification  []  See Discharge Summary         Progress towards goals / Updated goals:  Short Term Goals:    to be accomplished within 8 treatments:     Patient will be compliant and independent with prescribed HEP(s) in order to assist in maximizing therapeutic gains and improving overall function. Eval: HEP to be issued and reviewed with patient within 1-2 visits               Current: Patient reports daily compliance with her HEP.    12/23/22, met     Patient will report at least a 25% reduction in pain/symptoms while performing ADLs/functional activities in order to improve overall tolerance to functional movements and progress towards PLOF. Eval: pain ranges from 5-10/10; currently 3/10  Current:  3/10 at worst in the past week 1/6/23, met     Patient will be able to ambulate with normal gait mechanics in order to improve overall mobility and return patient to PLOF. Eval: WBOS, antalgic including lacking heel strike and decreased push off with medial heel whip, mildly slow gait speed               1/11/23 PN: Continued WBOS with antalgic weight bearing but bilateral heel strike present as well as equal bilateral step length but decreased push of with mild toe out compensation 1/6/23               Current:   Patient ambulated ~ 330'. Patient demos decreased stride length but bilateral foot passes stance foot and clears the floor. Patient demos decreased jose and mild toe out.   1/9/2023     Long Term Goals:     to be accomplished within 16 treatments:     Patient will score at least 62 points on FOTO in order to maximize function and promote patient satisfaction with overall outcome. (Beba Squibb is an established functional score where a score of 100 = no disability)  Eval: 51               Current: 72 1/6/23 MET     Patient will report less than or equal to 2/10 pain during all functional activities/ADLs in order to improve QOL and return to patient's PLOF. Eval: pain ranges from 5-10/10; currently 3/10               1/6/23 PN: 3/10 at worst in the past week , progressing               Current: 3/10 at worst in the past week 1/16/23     Patient will increase active dorsiflexion to at least 10 degrees in order to improve toe clearance during ambulation and to improve calf flexibility. Eval: active DF to 2 degs  1/6/23 PN: DF to 6 deg  progressing               Current:      Patient will demonstrate at least 5/5 strength bilaterally in order to be able to safely perform functional activities and demonstrate improved stability and strength.   Eval: grossly 5/5 except right ankle INV/EV 4-/5 (within available range)              1/6/23 PN: Grossly 5/5 Right ankle except for EV 4/5  progressing               Current:     PLAN  [x]  Upgrade activities as tolerated     [x]  Continue plan of care  []  Update interventions per flow sheet       []  Discharge due to:_  []  Other:_      Shahrzad Kirkpatrick PTA 1/18/2023  12:16 PM    Future Appointments   Date Time Provider Cricket Garvey   1/18/2023  5:30 PM [facility]   1/23/2023  5:30 PM [facility]   1/25/2023  5:30 PM [facility]

## 2023-01-23 ENCOUNTER — HOSPITAL ENCOUNTER (OUTPATIENT)
Dept: PHYSICAL THERAPY | Age: 43
Discharge: HOME | End: 2023-01-23
Payer: MEDICAID

## 2023-01-23 PROCEDURE — 97110 THERAPEUTIC EXERCISES: CPT

## 2023-01-23 PROCEDURE — 97112 NEUROMUSCULAR REEDUCATION: CPT

## 2023-01-23 PROCEDURE — 97530 THERAPEUTIC ACTIVITIES: CPT

## 2023-01-23 PROCEDURE — 97016 VASOPNEUMATIC DEVICE THERAPY: CPT

## 2023-01-23 NOTE — PROGRESS NOTES
PT DAILY TREATMENT NOTE    Patient Name: Janel Knapp  Date:2023  : 1980  [x]  Patient  Verified  Payor: BLUE CROSS MEDICAID / Plan: [facility] [employer] / Product Type: Managed Care Medicaid /    In time:5:36  Out time:6:30  Total Treatment Time (min): 54  Total Timed Codes (min): 54  1:1 Treatment Time (MC/BCBS only): 54   Visit #: 10 of 16    Treatment Dx: Pain in right ankle and joints of right foot [M25.571]    SUBJECTIVE  Pain Level (0-10 scale): 0/10  Any medication changes, allergies to medications, adverse drug reactions, diagnosis change, or new procedure performed?: [x] No    [] Yes (see summary sheet for update)  Subjective functional status/changes:   [] No changes reported  \"I don't have any pain right now. \"    OBJECTIVE      Modalities Rationale:     decrease edema, decrease inflammation and decrease pain to improve patient's ability to return to prior level of physical activity. 10 min [x]  Vasopneumatic Device, press/temp: Med/Low   If using vaso (only need to measure limb vaso being performed on)      pre-treatment girth : 56 cm      post-treatment girth : 55.5 cm      measured at (landmark location) :  Figure 8   [x] Skin assessment post-treatment (if applicable):    [x]  intact    []  redness- no adverse reaction                  []redness - adverse reaction:              19 min Therapeutic Exercise:  [x] See flow sheet :   Rationale: increase ROM, increase strength, and improve coordination to improve the patients ability to return to prior level of physical activity. 10 min Therapeutic Activity:  [x]  See flow sheet :   Rationale: increase ROM, increase strength, and improve coordination  to improve the patients ability to return to prior level of physical activity.       15 min Neuromuscular Re-education:  [x]  See flow sheet :   Rationale: increase ROM, increase strength, and improve coordination  to improve the patients ability to return to prior level of physical activity. With   [] TE   [] TA   [] neuro   [] other: Patient Education: [x] Review HEP    [] Progressed/Changed HEP based on:   [] positioning   [] body mechanics   [] transfers   [] heat/ice application    [] other:      Other Objective/Functional Measures:      Pain Level (0-10 scale) post treatment: 0/10    ASSESSMENT/Changes in Function: Pt arrived in clinic reporting no increased pain in ankle at this time. Pt reports lack of pain in ankle in the past week. Pt was able to tolerate all standing therex with no pain but, difficulty with stability balance exercises. Pt reported increased fatigue post therex but, not above tolerance. Pt received VASO post tx for reduction of pain tx. Patient will continue to benefit from skilled PT services to modify and progress therapeutic interventions, address functional mobility deficits, address ROM deficits, address strength deficits, analyze and address soft tissue restrictions, analyze and cue movement patterns, and analyze and modify body mechanics/ergonomics to attain remaining goals. [x]  See Plan of Care  []  See progress note/recertification  []  See Discharge Summary         Progress towards goals / Updated goals:  Short Term Goals:    to be accomplished within 8 treatments:     Patient will be compliant and independent with prescribed HEP(s) in order to assist in maximizing therapeutic gains and improving overall function. Eval: HEP to be issued and reviewed with patient within 1-2 visits               Current: Patient reports daily compliance with her HEP.    12/23/22, met     Patient will report at least a 25% reduction in pain/symptoms while performing ADLs/functional activities in order to improve overall tolerance to functional movements and progress towards PLOF.   Eval: pain ranges from 5-10/10; currently 3/10  Current:  3/10 at worst in the past week 1/6/23, met     Patient will be able to ambulate with normal gait mechanics in order to improve overall mobility and return patient to PLOF. Eval: WBOS, antalgic including lacking heel strike and decreased push off with medial heel whip, mildly slow gait speed               1/11/23 PN: Continued WBOS with antalgic weight bearing but bilateral heel strike present as well as equal bilateral step length but decreased push of with mild toe out compensation 1/6/23               Current:   Patient ambulated ~ 330'. Patient demos decreased stride length but bilateral foot passes stance foot and clears the floor. Patient demos decreased jose and mild toe out. 1/9/2023     Long Term Goals:     to be accomplished within 16 treatments:     Patient will score at least 62 points on FOTO in order to maximize function and promote patient satisfaction with overall outcome. (Sherill Abide is an established functional score where a score of 100 = no disability)  Eval: 51               Current: 72 1/6/23 MET     Patient will report less than or equal to 2/10 pain during all functional activities/ADLs in order to improve QOL and return to patient's PLOF. Eval: pain ranges from 5-10/10; currently 3/10               1/6/23 PN: 3/10 at worst in the past week , progressing               Current: 3/10 at worst in the past week 1/16/23     Patient will increase active dorsiflexion to at least 10 degrees in order to improve toe clearance during ambulation and to improve calf flexibility. Eval: active DF to 2 degs  1/6/23 PN: DF to 6 deg  progressing               Current:      Patient will demonstrate at least 5/5 strength bilaterally in order to be able to safely perform functional activities and demonstrate improved stability and strength. Eval: grossly 5/5 except right ankle INV/EV 4-/5 (within available range)              1/6/23 PN: Grossly 5/5 Right ankle except for EV 4/5  progressing               Current:  Will assess next visit for reassessment 1/23/23    PLAN  [x]  Upgrade activities as tolerated     [x]  Continue plan of care  []  Update interventions per flow sheet       []  Discharge due to:_  []  Other:_      Lynda Gorman PTA 1/23/2023  1:46 PM    Future Appointments   Date Time Provider Cricket Garvey   1/23/2023  5:30 PM [facility]   1/25/2023  5:30 PM [facility]

## 2023-01-25 ENCOUNTER — TELEPHONE (OUTPATIENT)
Dept: PHYSICAL THERAPY | Age: 43
End: 2023-01-25

## 2023-01-25 ENCOUNTER — HOSPITAL ENCOUNTER (OUTPATIENT)
Dept: PHYSICAL THERAPY | Age: 43
End: 2023-01-25
Payer: MEDICAID

## 2023-01-30 ENCOUNTER — APPOINTMENT (OUTPATIENT)
Dept: PHYSICAL THERAPY | Age: 43
End: 2023-01-30

## 2023-02-01 ENCOUNTER — APPOINTMENT (OUTPATIENT)
Dept: PHYSICAL THERAPY | Age: 43
End: 2023-02-01
Payer: MEDICAID

## 2023-02-06 ENCOUNTER — TELEPHONE (OUTPATIENT)
Dept: PHYSICAL THERAPY | Age: 43
End: 2023-02-06

## 2023-02-06 ENCOUNTER — APPOINTMENT (OUTPATIENT)
Dept: PHYSICAL THERAPY | Age: 43
End: 2023-02-06
Payer: MEDICAID

## 2023-02-08 ENCOUNTER — APPOINTMENT (OUTPATIENT)
Dept: PHYSICAL THERAPY | Age: 43
End: 2023-02-08
Payer: MEDICAID

## 2023-06-14 NOTE — PROGRESS NOTES
"-- DO NOT REPLY / DO NOT REPLY ALL --  -- Message is from [address] [facility] ([facility]) --    General Patient Message: Pawel Whitman with [facility] inpatient calling to schedule follow up within 30 days with Jessica Rees NP. Per clinic staff patient has not been seen in over a year. Please call patient back to schedule per Pawel Whitman at [phone]. Alternative phone number: none    Can a detailed message be left? Yes    Message Turnaround: Marco Antonio Ruff:    Refer to site's KB page for routing instructions    Please give this turnaround time to the caller:    ""You can expect to receive a response 2-3 business days after your provider's clinical team reviews the message\""              " Original plan was to perform a popliteal block /mac for anesthesia. After positioning, prepping and sedating the patient, I was not able to visualize the popliteal nerve past the crease behind the knee. Ultrasound not high quality and patient is morbidly obese. Therefore decision made to do ga/oett for her prone positioning.

## 2024-03-31 ENCOUNTER — HOSPITAL ENCOUNTER (EMERGENCY)
Facility: HOSPITAL | Age: 44
Discharge: HOME | End: 2024-03-31
Attending: EMERGENCY MEDICINE
Payer: MEDICAID

## 2024-03-31 VITALS
RESPIRATION RATE: 18 BRPM | TEMPERATURE: 97.5 F | HEIGHT: 68 IN | WEIGHT: 293 LBS | BODY MASS INDEX: 44.41 KG/M2 | DIASTOLIC BLOOD PRESSURE: 57 MMHG | SYSTOLIC BLOOD PRESSURE: 112 MMHG | OXYGEN SATURATION: 96 % | HEART RATE: 83 BPM

## 2024-03-31 DIAGNOSIS — R68.89 FLU-LIKE SYMPTOMS: Primary | ICD-10-CM

## 2024-03-31 LAB
APPEARANCE UR: CLEAR
BACTERIA URNS QL MICRO: ABNORMAL /HPF
BILIRUB UR QL: NEGATIVE
COLOR UR: YELLOW
EPITH CASTS URNS QL MICRO: ABNORMAL /LPF (ref 0–5)
FLUAV RNA SPEC QL NAA+PROBE: NOT DETECTED
FLUBV RNA SPEC QL NAA+PROBE: NOT DETECTED
GLUCOSE UR STRIP.AUTO-MCNC: NEGATIVE MG/DL
HGB UR QL STRIP: ABNORMAL
KETONES UR QL STRIP.AUTO: ABNORMAL MG/DL
LEUKOCYTE ESTERASE UR QL STRIP.AUTO: ABNORMAL
NITRITE UR QL STRIP.AUTO: NEGATIVE
PH UR STRIP: 5.5 (ref 5–8)
PROT UR STRIP-MCNC: NEGATIVE MG/DL
RBC #/AREA URNS HPF: ABNORMAL /HPF (ref 0–5)
SARS-COV-2 RNA RESP QL NAA+PROBE: NOT DETECTED
SP GR UR REFRACTOMETRY: 1.02 (ref 1–1.03)
UROBILINOGEN UR QL STRIP.AUTO: 0.2 EU/DL (ref 0.2–1)
WBC URNS QL MICRO: ABNORMAL /HPF (ref 0–5)

## 2024-03-31 PROCEDURE — 81001 URINALYSIS AUTO W/SCOPE: CPT

## 2024-03-31 PROCEDURE — 99283 EMERGENCY DEPT VISIT LOW MDM: CPT

## 2024-03-31 PROCEDURE — 87636 SARSCOV2 & INF A&B AMP PRB: CPT

## 2024-03-31 PROCEDURE — 6370000000 HC RX 637 (ALT 250 FOR IP): Performed by: EMERGENCY MEDICINE

## 2024-03-31 RX ORDER — IBUPROFEN 600 MG/1
600 TABLET ORAL
Status: COMPLETED | OUTPATIENT
Start: 2024-03-31 | End: 2024-03-31

## 2024-03-31 RX ORDER — ACETAMINOPHEN 325 MG/1
650 TABLET ORAL ONCE
Status: COMPLETED | OUTPATIENT
Start: 2024-03-31 | End: 2024-03-31

## 2024-03-31 RX ADMIN — ACETAMINOPHEN 650 MG: 325 TABLET ORAL at 04:44

## 2024-03-31 RX ADMIN — IBUPROFEN 600 MG: 600 TABLET, FILM COATED ORAL at 04:44

## 2024-03-31 ASSESSMENT — PAIN SCALES - GENERAL
PAINLEVEL_OUTOF10: 6
PAINLEVEL_OUTOF10: 8
PAINLEVEL_OUTOF10: 5

## 2024-03-31 ASSESSMENT — PAIN DESCRIPTION - LOCATION
LOCATION: GENERALIZED
LOCATION: GENERALIZED

## 2024-03-31 ASSESSMENT — LIFESTYLE VARIABLES
HOW OFTEN DO YOU HAVE A DRINK CONTAINING ALCOHOL: NEVER
HOW MANY STANDARD DRINKS CONTAINING ALCOHOL DO YOU HAVE ON A TYPICAL DAY: PATIENT DOES NOT DRINK

## 2024-03-31 ASSESSMENT — PAIN - FUNCTIONAL ASSESSMENT
PAIN_FUNCTIONAL_ASSESSMENT: 0-10
PAIN_FUNCTIONAL_ASSESSMENT: 0-10

## 2024-03-31 ASSESSMENT — PAIN DESCRIPTION - DESCRIPTORS: DESCRIPTORS: ACHING

## 2024-03-31 NOTE — ED NOTES
Patient A/O with NAD noted; needs addressed; placed on monitor; UA obtained; will assume care of patient

## 2024-03-31 NOTE — ED TRIAGE NOTES
Pt presents to ED ambulatory from triage with c/o chills, fever, and body aches that started yesterday;

## 2024-03-31 NOTE — ED PROVIDER NOTES
[facility] EMERGENCY DEPT  EMERGENCY DEPARTMENT ENCOUNTER    Patient Name: Nikki Rose  MRN: [id number]  YOB: 1980  Provider: Jorge Luis Lockhart MD  PCP: Latonia Barker APRN - NP   Time/Date of evaluation: 4:42 AM EDT on 3/31/24    History of Presenting Illness     History Provided by: Patient  History is limited by: Nothing     HISTORY:   Nikki Rose is a 44 y.o. female presenting with about 2 days of fatigue body aches and chills.  She had a fever earlier today of 100.8 degrees.  Denies any chest pain or shortness of breath.  No cough.  No vomiting or diarrhea or abdominal pain.  Denies any other symptoms    Nursing Notes were all reviewed and agreed with or any disagreements were addressed in the HPI.    Past History     PAST MEDICAL HISTORY:  Past Medical History:   Diagnosis Date    Asthma     no inhaler    Endocrine disease     thyroid    GERD (gastroesophageal reflux disease)     Hypertension     Morbid obesity (HCC)     Seizures (HCC)     isolated seizure at 3 years of age     Thyroid disease        PAST SURGICAL HISTORY:  No past surgical history on file.    FAMILY HISTORY:  No family history on file.    SOCIAL HISTORY:  Social History     Tobacco Use    Smoking status: Never    Smokeless tobacco: Never   Substance Use Topics    Alcohol use: Yes    Drug use: Not Currently     Types: Marijuana (Weed)       MEDICATIONS:  No current facility-administered medications for this encounter.     Current Outpatient Medications   Medication Sig Dispense Refill    dilTIAZem (TIAZAC) 180 MG extended release capsule Take 180 mg by mouth daily      levothyroxine (SYNTHROID) 100 MCG tablet Take 100 mcg by mouth every morning (before breakfast)      pantoprazole (PROTONIX) 40 MG tablet Take 40 mg by mouth every evening      SUMAtriptan (IMITREX) 50 MG tablet Take 50 mg by mouth once as needed         ALLERGIES:  No Known Allergies    SOCIAL DETERMINANTS OF HEALTH:  Social Determinants of

## 2024-11-21 ENCOUNTER — HOSPITAL ENCOUNTER (OUTPATIENT)
Facility: HOSPITAL | Age: 44
Setting detail: RECURRING SERIES
Discharge: HOME | End: 2024-11-24
Payer: MEDICAID

## 2024-11-21 PROCEDURE — 97161 PT EVAL LOW COMPLEX 20 MIN: CPT

## 2024-11-21 NOTE — PROGRESS NOTES
[facility] [address]  Ph [phone]  Fx [phone]    Plan of Care/ Statement of Necessity for Physical Therapy Services      Patient name: Nikki Rose Start of Care: 2024   Referral source: Rosanna Robles DPM : 1980    Medical Diagnosis: Right ankle pain [M25.571]   Onset Date:24    Treatment Diagnosis: M25.571  RIGHT ANKLE PAIN      Prior Hospitalization: see medical history Provider#: [id number]   Medications: Verified on Patient summary List     Comorbidities: chronic bilateral Achilles tendonitis with prior left Achilles tendon surgery, known bilateral bone spurs bilateral Achilles, OA, high cholesterol, HTN, heart palpitations, osteoporosis, hypothyroidism, morbid obesity, 4 pregnancies with 3 childbirths (all vaginal), GERD, PAD  Substance Use: [] tobacco use, [] alcohol use, [] other:   Patients Self-Rated Overall Health Status: [] poor, [x] fair, [] good, [] excellent  Number of Falls Within the Past Year: [x] none, []   Prior Level of Function:   [x] Unrestricted with functional activities and ADL's  [x] No assistive device  [] active lifestyle, [x] moderately active lifestyle, [] sedentary lifestyle  Patients Goals:  \"I'd like to help find strategies to keep it mobile and comfortable.\"      The Plan of Care and following information is based on the information from the initial evaluation.  Assessment/ key information: Patient is a pleasant 44 y.o. female presenting to skilled PT c/o right Achilles tendon pain that started a few months ago.  Patient states she's been to the doctor and states the doctor would like for her to wear a CAM boot, but patient states she is not able to because of wear she works, so she's been wearing Crocs.  Patient states x-rays have shown a bone spur along at the calcaneal insertion of the Achilles.  Patient states that's wear the majority of her pain is, though at time she does feel as

## 2024-11-21 NOTE — PROGRESS NOTES
PHYSICAL THERAPY EVALUATION / DAILY TREATMENT      Patient Name: Nikki Rose    Date: 2024    : 1980  Insurance: Payor: [facility] MEDICAID / Plan: [facility] HEALTHKEEPERS PLUS / Product Type: *No Product type* /      Patient  verified yes     Visit #   Current / Total 1 8   Time   In / Out 3:15 3:45   Pain   In / Out 2 2     TREATMENT AREA =  Right ankle pain [M25.571]    If an interpreting service is utilized for treatment of this patient, the contents of this document represent the material reviewed with the patient via the .     SUBJECTIVE:  Chief Complaint/Mechanism of Injury:   Patient is a pleasant 44 y.o. female with c/o right Achilles tendon pain that started a few months ago.  Patient states she's been to the doctor and states the doctor would like for her to wear a CAM boot, but patient states she is not able to because of wear she works, so she's been wearing Crocs.  Patient states x-rays have shown a bone spur along at the calcaneal insertion of the Achilles.  Patient states that's wear the majority of her pain is, though at time she does feel as though her \"calf is tight\" stating then she has trouble walking normal.  Patient denies any myrna foot or ankle pain, though she does on occasion have arch pain that she describes as \"ripping, but it's not there all the time.\"  Patient states she's tried Prednisone and different shoes, but patient states these have only helped to provide some relief.  \"I'm hoping to avoid surgery.\"  Patient states she has already undergone surgery of her contralateral Achilles about 3-4 years ago for the same reason.  Patient c/o increased pain and/or difficulty with standing > 5 minutes, when walking at quicker speeds, stair negotiations (she does step-to pattern and finds descents the most painful).  Patient feels as though her balance is less during times that her pain has increased, stating she has to hold onto her husbands

## 2024-12-03 ENCOUNTER — HOSPITAL ENCOUNTER (OUTPATIENT)
Facility: HOSPITAL | Age: 44
Setting detail: RECURRING SERIES
Discharge: HOME | End: 2024-12-06
Payer: MEDICAID

## 2024-12-03 PROCEDURE — 97535 SELF CARE MNGMENT TRAINING: CPT

## 2024-12-03 PROCEDURE — 97530 THERAPEUTIC ACTIVITIES: CPT

## 2024-12-03 PROCEDURE — 97110 THERAPEUTIC EXERCISES: CPT

## 2024-12-03 NOTE — PROGRESS NOTES
PHYSICAL / OCCUPATIONAL THERAPY - DAILY TREATMENT NOTE     Patient Name: Nikki Rose    Date: 12/3/2024    : 1980  Insurance: Payor: [facility] MEDICAID / Plan: [facility] HEALTHKEEPERS PLUS / Product Type: *No Product type* /      Patient  verified Yes     Visit #   Current / Total 2 8   Time   In / Out 1:50 2:45   Pain   In / Out 2/10 2/10   Subjective Functional Status/Changes: Pt arrived with reports of mild right    Changes to:  Allergies, Med Hx, Sx Hx?   no       TREATMENT AREA =  Right ankle pain [M25.571]    If an interpreting service is utilized for treatment of this patient, the contents of this document represent the material reviewed with the patient via the .     OBJECTIVE        Therapeutic Procedures:  Tx Min Billable or 1:1 Min (if diff from Tx Min) Procedure, Rationale, Specifics   30  74047 Therapeutic Exercise (timed):  increase ROM, strength, coordination, balance, and proprioception to improve patient's ability to progress to PLOF and address remaining functional goals. (see flow sheet as applicable)    Details if applicable:         39201 Neuromuscular Re-Education (timed):  improve balance, coordination, kinesthetic sense, posture, core stability and proprioception to improve patient's ability to develop conscious control of individual muscles and awareness of position of extremities in order to progress to PLOF and address remaining functional goals. (see flow sheet as applicable)    Details if applicable:     15  13699 Therapeutic Activity (timed):  use of dynamic activities replicating functional movements to increase ROM, strength, coordination, balance, and proprioception in order to improve patient's ability to progress to PLOF and address remaining functional goals.  (see flow sheet as applicable)     Details if applicable:     10  08319 Self Care/Home Management (timed):  improve patient knowledge and understanding of home injury/symptom/pain

## 2024-12-05 ENCOUNTER — HOSPITAL ENCOUNTER (OUTPATIENT)
Facility: HOSPITAL | Age: 44
Setting detail: RECURRING SERIES
Discharge: HOME | End: 2024-12-08
Payer: MEDICAID

## 2024-12-05 PROCEDURE — 97530 THERAPEUTIC ACTIVITIES: CPT

## 2024-12-05 PROCEDURE — 97110 THERAPEUTIC EXERCISES: CPT

## 2024-12-05 PROCEDURE — 97112 NEUROMUSCULAR REEDUCATION: CPT

## 2024-12-05 NOTE — PROGRESS NOTES
PHYSICAL / OCCUPATIONAL THERAPY - DAILY TREATMENT NOTE     Patient Name: Nikki Rose    Date: 2024    : 1980  Insurance: Payor: [facility] MEDICAID / Plan: [employer]NAHI [facility] HEALTHKEEPERS PLUS / Product Type: *No Product type* /      Patient  verified Yes     Visit #   Current / Total 3 8   Time   In / Out 3:52 4:54   Pain   In / Out 2/10 2/10   Subjective Functional Status/Changes: Pt states she had some swelling after working a shift last night    Changes to:  Allergies, Med Hx, Sx Hx?   no       TREATMENT AREA =  Right ankle pain [M25.571]    If an interpreting service is utilized for treatment of this patient, the contents of this document represent the material reviewed with the patient via the .     OBJECTIVE      Therapeutic Procedures:  Tx Min Billable or 1:1 Min (if diff from Tx Min) Procedure, Rationale, Specifics   32  [id number] Therapeutic Exercise (timed):  increase ROM, strength, coordination, balance, and proprioception to improve patient's ability to progress to PLOF and address remaining functional goals. (see flow sheet as applicable)    Details if applicable:       15  06686 Neuromuscular Re-Education (timed):  improve balance, coordination, kinesthetic sense, posture, core stability and proprioception to improve patient's ability to develop conscious control of individual muscles and awareness of position of extremities in order to progress to PLOF and address remaining functional goals. (see flow sheet as applicable)    Details if applicable:     15  40473 Therapeutic Activity (timed):  use of dynamic activities replicating functional movements to increase ROM, strength, coordination, balance, and proprioception in order to improve patient's ability to progress to PLOF and address remaining functional goals.  (see flow sheet as applicable)     Details if applicable:           Details if applicable:            Details if applicable:     62  [facility] Totals Reminder: bill

## 2024-12-10 ENCOUNTER — HOSPITAL ENCOUNTER (OUTPATIENT)
Facility: HOSPITAL | Age: 44
Setting detail: RECURRING SERIES
Discharge: HOME | End: 2024-12-13
Payer: MEDICAID

## 2024-12-10 PROCEDURE — 97110 THERAPEUTIC EXERCISES: CPT

## 2024-12-10 PROCEDURE — 97112 NEUROMUSCULAR REEDUCATION: CPT

## 2024-12-10 PROCEDURE — 97530 THERAPEUTIC ACTIVITIES: CPT

## 2024-12-10 NOTE — PROGRESS NOTES
PHYSICAL / OCCUPATIONAL THERAPY - DAILY TREATMENT NOTE     Patient Name: Nikki Rose    Date: 12/10/2024    : 1980  Insurance: Payor: [facility] MEDICAID / Plan: [employer] HEALTHKEEPERS PLUS / Product Type: *No Product type* /      Patient  verified Yes     Visit #   Current / Total 4 8   Time   In / Out 3:52 4:30   Pain   In / Out 6 6   Subjective Functional Status/Changes: Patient reports increased pain since this weekend   Changes to:  Allergies, Med Hx, Sx Hx?   no       TREATMENT AREA =  Right ankle pain [M25.571]    If an interpreting service is utilized for treatment of this patient, the contents of this document represent the material reviewed with the patient via the .     OBJECTIVE      Therapeutic Procedures:  Tx Min Billable or 1:1 Min (if diff from Tx Min) Procedure, Rationale, Specifics   15  98097 Therapeutic Exercise (timed):  increase ROM, strength, coordination, balance, and proprioception to improve patient's ability to progress to PLOF and address remaining functional goals. (see flow sheet as applicable)    Details if applicable:       10  53503 Neuromuscular Re-Education (timed):  improve balance, coordination, kinesthetic sense, posture, core stability and proprioception to improve patient's ability to develop conscious control of individual muscles and awareness of position of extremities in order to progress to PLOF and address remaining functional goals. (see flow sheet as applicable)    Details if applicable:     13  00276 Therapeutic Activity (timed):  use of dynamic activities replicating functional movements to increase ROM, strength, coordination, balance, and proprioception in order to improve patient's ability to progress to PLOF and address remaining functional goals.  (see flow sheet as applicable)     Details if applicable:           Details if applicable:            Details if applicable:     38  [facility] Totals Reminder: bill using total billable

## 2024-12-12 ENCOUNTER — HOSPITAL ENCOUNTER (OUTPATIENT)
Facility: HOSPITAL | Age: 44
Setting detail: RECURRING SERIES
Discharge: HOME | End: 2024-12-15
Payer: MEDICAID

## 2024-12-12 PROCEDURE — 97112 NEUROMUSCULAR REEDUCATION: CPT

## 2024-12-12 PROCEDURE — 97110 THERAPEUTIC EXERCISES: CPT

## 2024-12-12 PROCEDURE — 97530 THERAPEUTIC ACTIVITIES: CPT

## 2024-12-12 NOTE — PROGRESS NOTES
PHYSICAL / OCCUPATIONAL THERAPY - DAILY TREATMENT NOTE     Patient Name: Nikki Rose    Date: 2024    : 1980  Insurance: Payor: [facility] MEDICAID / Plan: [facility] HEALTHKEEPERS PLUS / Product Type: *No Product type* /      Patient  verified Yes     Visit #   Current / Total 5 8   Time   In / Out 4:22 5:06   Pain   In / Out 2-3 2/10   Subjective Functional Status/Changes: Patient states most of the time her Achilles pain is a 2/10, but it does spike up to 6/10 when she spends all day on her feet standing and walking.   Changes to:  Allergies, Med Hx, Sx Hx?   no       TREATMENT AREA =  Right ankle pain [M25.571]    If an interpreting service is utilized for treatment of this patient, the contents of this document represent the material reviewed with the patient via the .     OBJECTIVE    Therapeutic Procedures:  Tx Min Billable or 1:1 Min (if diff from Tx Min) Procedure, Rationale, Specifics   26  37555 Therapeutic Exercise (timed):  increase ROM, strength, coordination, balance, and proprioception to improve patient's ability to progress to PLOF and address remaining functional goals. (see flow sheet as applicable)    Details if applicable:       10  94297 Therapeutic Activity (timed):  use of dynamic activities replicating functional movements to increase ROM, strength, coordination, balance, and proprioception in order to improve patient's ability to progress to PLOF and address remaining functional goals.  (see flow sheet as applicable)    Details if applicable:     8  60957 Neuromuscular Re-Education (timed):  improve balance, coordination, kinesthetic sense, posture, core stability and proprioception to improve patient's ability to develop conscious control of individual muscles and awareness of position of extremities in order to progress to PLOF and address remaining functional goals. (see flow sheet as applicable)     Details if applicable:     44  [facility] Totals

## 2024-12-17 ENCOUNTER — HOSPITAL ENCOUNTER (OUTPATIENT)
Facility: HOSPITAL | Age: 44
Setting detail: RECURRING SERIES
Discharge: HOME | End: 2024-12-20
Payer: MEDICAID

## 2024-12-17 PROCEDURE — 97530 THERAPEUTIC ACTIVITIES: CPT

## 2024-12-17 PROCEDURE — 97110 THERAPEUTIC EXERCISES: CPT

## 2024-12-17 PROCEDURE — 97112 NEUROMUSCULAR REEDUCATION: CPT

## 2024-12-17 NOTE — PROGRESS NOTES
PHYSICAL / OCCUPATIONAL THERAPY - DAILY TREATMENT NOTE     Patient Name: Nikki Rose    Date: 2024    : 1980  Insurance: Payor: [facility] MEDICAID / Plan: [employer]NAHI [facility] HEALTHKEEPERS PLUS / Product Type: *No Product type* /      Patient  verified Yes     Visit #   Current / Total 6 8   Time   In / Out 315 353   Pain   In / Out 3-4 2   Subjective Functional Status/Changes: Pt reported that she slipped in the shower and bruised her medial ankle and calf    Changes to:  Allergies, Med Hx, Sx Hx?   no       TREATMENT AREA =  Right ankle pain [M25.571]    If an interpreting service is utilized for treatment of this patient, the contents of this document represent the material reviewed with the patient via the .     OBJECTIVE    Therapeutic Procedures:  Tx Min Billable or 1:1 Min (if diff from Tx Min) Procedure, Rationale, Specifics   15  97753 Therapeutic Exercise (timed):  increase ROM, strength, coordination, balance, and proprioception to improve patient's ability to progress to PLOF and address remaining functional goals. (see flow sheet as applicable)    Details if applicable:       15  22179 Neuromuscular Re-Education (timed):  improve balance, coordination, kinesthetic sense, posture, core stability and proprioception to improve patient's ability to develop conscious control of individual muscles and awareness of position of extremities in order to progress to PLOF and address remaining functional goals. (see flow sheet as applicable)    Details if applicable:     8  51568 Therapeutic Activity (timed):  use of dynamic activities replicating functional movements to increase ROM, strength, coordination, balance, and proprioception in order to improve patient's ability to progress to PLOF and address remaining functional goals.  (see flow sheet as applicable)     Details if applicable:     38  [facility] Totals Reminder: bill using total billable min of TIMED therapeutic procedures

## 2024-12-19 ENCOUNTER — HOSPITAL ENCOUNTER (OUTPATIENT)
Facility: HOSPITAL | Age: 44
Setting detail: RECURRING SERIES
Discharge: HOME | End: 2024-12-22
Payer: MEDICAID

## 2024-12-19 PROCEDURE — 97016 VASOPNEUMATIC DEVICE THERAPY: CPT

## 2024-12-19 PROCEDURE — 97530 THERAPEUTIC ACTIVITIES: CPT

## 2024-12-19 PROCEDURE — 97535 SELF CARE MNGMENT TRAINING: CPT

## 2024-12-19 NOTE — PROGRESS NOTES
[facility] at [facility]  [address], [place] [address]  Phone [phone]  Fax [phone]    Physical Therapy Discharge Summary    Patient name: Nikki Rose Start of Care: 2024   Referral source: Rosanna Robles DPM : 1980               Medical Diagnosis: Right ankle pain [M25.571]    Onset Date:24               Treatment Diagnosis: M25.571  RIGHT ANKLE PAIN      Prior Hospitalization: see medical history Provider#: [id number]   Medications: Verified on Patient summary List      Comorbidities: chronic bilateral Achilles tendonitis with prior left Achilles tendon surgery, known bilateral bone spurs bilateral Achilles, OA, high cholesterol, HTN, heart palpitations, osteoporosis, hypothyroidism, morbid obesity, 4 pregnancies with 3 childbirths (all vaginal), GERD, PAD  Substance Use: [] tobacco use, [] alcohol use, [] other:   Patients Self-Rated Overall Health Status: [] poor, [x] fair, [] good, [] excellent  Number of Falls Within the Past Year: [x] none, []   Prior Level of Function:   [x] Unrestricted with functional activities and ADL's  [x] No assistive device  [] active lifestyle, [x] moderately active lifestyle, [] sedentary lifestyle  Patients Goals:  \"I'd like to help find strategies to keep it mobile and comfortable.\"    Visits from Start of Care: 7    Missed Visits: 0    Reporting Period : 24 to 24    Assessment / Summary of Care:  Patient has completed 7 visits of skilled PT for treatment of right ankle/Achilles tendon pain.  Patient has made excellent progress, including having met all PT goals.  Patient has returned to her normal functional activities, though she does still report minor pain after spending all day on her feet at work, but states this is manageable with rest.  Patient is very satisfied with her current outcome and is ready to d/c to an independent HEP at this time.  Thank you for the referral to [facility]

## 2024-12-19 NOTE — PROGRESS NOTES
PHYSICAL / OCCUPATIONAL THERAPY - DAILY TREATMENT NOTE     Patient Name: Nikki Rose    Date: 2024    : 1980  Insurance: Payor: [facility] MEDICAID / Plan: ANAHI [facility] HEALTHKEEPERS PLUS / Product Type: *No Product type* /      Patient  verified Yes     Visit #   Current / Total 7 8   Time   In / Out 2:30 3:13   Pain   In / Out 1-10 0/10   Subjective Functional Status/Changes: \"I have a little pain.\"   Changes to:  Allergies, Med Hx, Sx Hx?   no       TREATMENT AREA =  Right ankle pain [M25.571]    If an interpreting service is utilized for treatment of this patient, the contents of this document represent the material reviewed with the patient via the .     OBJECTIVE    Modalities Rationale:     decrease edema, decrease inflammation, and decrease pain to improve patient's ability to progress to PLOF and address remaining functional goals.     min [] Estim Unattended, type/location:                                      []  w/ice    []  w/heat    min [] Estim Attended, type/location:                                     []  w/US     []  w/ice    []  w/heat    []  TENS insruct      min []  Mechanical Traction: type/lbs                   []  pro   []  sup   []  int   []  cont    []  before manual    []  after manual    min []  Ultrasound, settings/location:      min []  Iontophoresis w/ dexamethasone, location:                                               []  take home patch       []  in clinic        min  unbilled []  Ice     []  Heat    location/position:     min []  Paraffin,  details:    10 min [x]  Vasopneumatic Device, press/temp: Med/Low    min []  Whirlpool / Fluido:    If using vaso (only need to measure limb vaso being performed on)      pre-treatment girth : 56.2 cm      post-treatment girth : 55.2 cm      measured at (landmark location) :  Mid-patella    min []  Other:    Skin assessment post-treatment (if applicable):    [x]  intact    []  redness- no adverse reaction

## 2024-12-19 NOTE — PROGRESS NOTES
Physical Therapy Discharge Instructions    [facility] at [facility]  [address] [place] [address]  Ph [phone]  Fx [phone]      Patient: Nikki Rose  : 1980      Continue Home Exercise Program 1 times per day for 3 weeks, then decrease to 3 times per week      Continue with    [x] Ice  as needed     [x] Heat           Follow up with MD:     [] Upon completion of therapy     [x] As needed      Recommendations:     [x]   Return to activity with home program    []   Return to activity with the following modifications:       []Post Rehab Program    []Join Independent aquatic program     []Return to/join local gym        Additional Comments:         Brendon Hawkins, PTA 2024 3:23 PM

## 2024-12-24 ENCOUNTER — APPOINTMENT (OUTPATIENT)
Facility: HOSPITAL | Age: 44
End: 2024-12-24
Payer: MEDICAID

## 2025-03-08 NOTE — ED NOTES
to bedside to evaluate pt;   Pt placed on bedside CM showing SR;  VSS; call bell within reach; pt denies any CP or SOB at this time; [facility] No

## 2025-03-09 ENCOUNTER — APPOINTMENT (OUTPATIENT)
Facility: HOSPITAL | Age: 45
End: 2025-03-09
Payer: MEDICAID

## 2025-03-09 ENCOUNTER — HOSPITAL ENCOUNTER (EMERGENCY)
Facility: HOSPITAL | Age: 45
Discharge: HOME | End: 2025-03-09
Attending: EMERGENCY MEDICINE
Payer: MEDICAID

## 2025-03-09 VITALS
DIASTOLIC BLOOD PRESSURE: 76 MMHG | SYSTOLIC BLOOD PRESSURE: 156 MMHG | WEIGHT: 291 LBS | BODY MASS INDEX: 44.1 KG/M2 | HEART RATE: 62 BPM | OXYGEN SATURATION: 100 % | TEMPERATURE: 98.2 F | HEIGHT: 68 IN | RESPIRATION RATE: 15 BRPM

## 2025-03-09 DIAGNOSIS — D73.5 SPLENIC INFARCT: ICD-10-CM

## 2025-03-09 DIAGNOSIS — Z90.3 HISTORY OF SLEEVE GASTRECTOMY: ICD-10-CM

## 2025-03-09 DIAGNOSIS — R10.12 LEFT UPPER QUADRANT ABDOMINAL PAIN: Primary | ICD-10-CM

## 2025-03-09 LAB
ALBUMIN SERPL-MCNC: 3.2 G/DL (ref 3.4–5)
ALBUMIN/GLOB SERPL: 0.9 (ref 0.8–1.7)
ALP SERPL-CCNC: 70 U/L (ref 45–117)
ALT SERPL-CCNC: 33 U/L (ref 13–56)
ANION GAP SERPL CALC-SCNC: 6 MMOL/L (ref 3–18)
APPEARANCE UR: ABNORMAL
AST SERPL-CCNC: 36 U/L (ref 10–38)
BACTERIA URNS QL MICRO: ABNORMAL /HPF
BASOPHILS # BLD: 0.05 K/UL (ref 0–0.1)
BASOPHILS NFR BLD: 0.6 % (ref 0–2)
BILIRUB SERPL-MCNC: 0.6 MG/DL (ref 0.2–1)
BILIRUB UR QL: NEGATIVE
BUN SERPL-MCNC: 13 MG/DL (ref 7–18)
BUN/CREAT SERPL: 16 (ref 12–20)
CALCIUM SERPL-MCNC: 8.4 MG/DL (ref 8.5–10.1)
CHLORIDE SERPL-SCNC: 107 MMOL/L (ref 100–111)
CO2 SERPL-SCNC: 26 MMOL/L (ref 21–32)
COLOR UR: YELLOW
CREAT SERPL-MCNC: 0.83 MG/DL (ref 0.6–1.3)
DIFFERENTIAL METHOD BLD: ABNORMAL
EOSINOPHIL # BLD: 0.2 K/UL (ref 0–0.4)
EOSINOPHIL NFR BLD: 2.4 % (ref 0–5)
EPITH CASTS URNS QL MICRO: ABNORMAL /LPF (ref 0–5)
ERYTHROCYTE [DISTWIDTH] IN BLOOD BY AUTOMATED COUNT: 13.8 % (ref 11.6–14.5)
GLOBULIN SER CALC-MCNC: 3.4 G/DL (ref 2–4)
GLUCOSE SERPL-MCNC: 79 MG/DL (ref 74–99)
GLUCOSE UR STRIP.AUTO-MCNC: NEGATIVE MG/DL
HCG UR QL: NEGATIVE
HCT VFR BLD AUTO: 44 % (ref 35–45)
HGB BLD-MCNC: 13.9 G/DL (ref 12–16)
HGB UR QL STRIP: NEGATIVE
IMM GRANULOCYTES # BLD AUTO: 0.04 K/UL (ref 0–0.04)
IMM GRANULOCYTES NFR BLD AUTO: 0.5 % (ref 0–0.5)
KETONES UR QL STRIP.AUTO: 15 MG/DL
LEUKOCYTE ESTERASE UR QL STRIP.AUTO: ABNORMAL
LIPASE SERPL-CCNC: 24 U/L (ref 13–75)
LYMPHOCYTES # BLD: 1.45 K/UL (ref 0.9–3.3)
LYMPHOCYTES NFR BLD: 17.4 % (ref 21–52)
MCH RBC QN AUTO: 28.7 PG (ref 24–34)
MCHC RBC AUTO-ENTMCNC: 31.6 G/DL (ref 31–37)
MCV RBC AUTO: 90.9 FL (ref 78–100)
MONOCYTES # BLD: 0.57 K/UL (ref 0.05–1.2)
MONOCYTES NFR BLD: 6.8 % (ref 3–10)
NEUTS SEG # BLD: 6.04 K/UL (ref 1.8–8)
NEUTS SEG NFR BLD: 72.3 % (ref 40–73)
NITRITE UR QL STRIP.AUTO: NEGATIVE
NRBC # BLD: 0 K/UL (ref 0–0.01)
NRBC BLD-RTO: 0 PER 100 WBC
PH UR STRIP: 6 (ref 5–8)
PLATELET # BLD AUTO: 239 K/UL (ref 135–420)
PMV BLD AUTO: 10.6 FL (ref 9.2–11.8)
POTASSIUM SERPL-SCNC: 3.9 MMOL/L (ref 3.5–5.5)
PROT SERPL-MCNC: 6.6 G/DL (ref 6.4–8.2)
PROT UR STRIP-MCNC: NEGATIVE MG/DL
RBC # BLD AUTO: 4.84 M/UL (ref 4.2–5.3)
RBC #/AREA URNS HPF: ABNORMAL /HPF (ref 0–5)
SODIUM SERPL-SCNC: 139 MMOL/L (ref 136–145)
SP GR UR REFRACTOMETRY: 1.01 (ref 1–1.03)
UROBILINOGEN UR QL STRIP.AUTO: 1 EU/DL (ref 0.2–1)
WBC # BLD AUTO: 8.4 K/UL (ref 4.6–13.2)
WBC URNS QL MICRO: ABNORMAL /HPF (ref 0–5)

## 2025-03-09 PROCEDURE — 96374 THER/PROPH/DIAG INJ IV PUSH: CPT

## 2025-03-09 PROCEDURE — 74177 CT ABD & PELVIS W/CONTRAST: CPT

## 2025-03-09 PROCEDURE — 80053 COMPREHEN METABOLIC PANEL: CPT

## 2025-03-09 PROCEDURE — 83690 ASSAY OF LIPASE: CPT

## 2025-03-09 PROCEDURE — 96375 TX/PRO/DX INJ NEW DRUG ADDON: CPT

## 2025-03-09 PROCEDURE — 99285 EMERGENCY DEPT VISIT HI MDM: CPT

## 2025-03-09 PROCEDURE — 6360000004 HC RX CONTRAST MEDICATION: Performed by: EMERGENCY MEDICINE

## 2025-03-09 PROCEDURE — 81001 URINALYSIS AUTO W/SCOPE: CPT

## 2025-03-09 PROCEDURE — 81025 URINE PREGNANCY TEST: CPT

## 2025-03-09 PROCEDURE — 2580000003 HC RX 258: Performed by: EMERGENCY MEDICINE

## 2025-03-09 PROCEDURE — 6360000002 HC RX W HCPCS: Performed by: EMERGENCY MEDICINE

## 2025-03-09 PROCEDURE — 85025 COMPLETE CBC W/AUTO DIFF WBC: CPT

## 2025-03-09 RX ORDER — OXYCODONE AND ACETAMINOPHEN 5; 325 MG/1; MG/1
1 TABLET ORAL EVERY 6 HOURS PRN
Qty: 12 TABLET | Refills: 0 | Status: SHIPPED | OUTPATIENT
Start: 2025-03-09 | End: 2025-03-12

## 2025-03-09 RX ORDER — MORPHINE SULFATE 4 MG/ML
4 INJECTION, SOLUTION INTRAMUSCULAR; INTRAVENOUS
Refills: 0 | Status: COMPLETED | OUTPATIENT
Start: 2025-03-09 | End: 2025-03-09

## 2025-03-09 RX ORDER — ONDANSETRON 2 MG/ML
4 INJECTION INTRAMUSCULAR; INTRAVENOUS
Status: COMPLETED | OUTPATIENT
Start: 2025-03-09 | End: 2025-03-09

## 2025-03-09 RX ORDER — IOPAMIDOL 612 MG/ML
100 INJECTION, SOLUTION INTRAVASCULAR
Status: COMPLETED | OUTPATIENT
Start: 2025-03-09 | End: 2025-03-09

## 2025-03-09 RX ORDER — 0.9 % SODIUM CHLORIDE 0.9 %
1000 INTRAVENOUS SOLUTION INTRAVENOUS ONCE
Status: COMPLETED | OUTPATIENT
Start: 2025-03-09 | End: 2025-03-09

## 2025-03-09 RX ADMIN — IOPAMIDOL 100 ML: 612 INJECTION, SOLUTION INTRAVENOUS at 09:16

## 2025-03-09 RX ADMIN — MORPHINE SULFATE 4 MG: 4 INJECTION, SOLUTION INTRAMUSCULAR; INTRAVENOUS at 08:24

## 2025-03-09 RX ADMIN — ONDANSETRON 4 MG: 2 INJECTION, SOLUTION INTRAMUSCULAR; INTRAVENOUS at 08:24

## 2025-03-09 RX ADMIN — IOHEXOL 25 ML: 240 INJECTION, SOLUTION INTRATHECAL; INTRAVASCULAR; INTRAVENOUS; ORAL at 08:22

## 2025-03-09 RX ADMIN — SODIUM CHLORIDE 1000 ML: 0.9 INJECTION, SOLUTION INTRAVENOUS at 08:47

## 2025-03-09 ASSESSMENT — PAIN - FUNCTIONAL ASSESSMENT: PAIN_FUNCTIONAL_ASSESSMENT: 0-10

## 2025-03-09 ASSESSMENT — PAIN SCALES - GENERAL: PAINLEVEL_OUTOF10: 5

## 2025-03-09 NOTE — DISCHARGE INSTRUCTIONS
Thank you for choosing [facility]'s Emergency Department for your care. It is our privilege to provide excellent care for you in your time of need. In the next several days, you may receive a survey via mail or email about your experience with our team. We would appreciate you taking a few minutes to complete the survey, as we use this information to learn what we have done well and what we could be doing better. Thank you for trusting us with your care.    -----------------------------------------------------------------------------  Below you will find a list of your tests from today's visit.   Labs  Recent Results (from the past 12 hours)   CBC with Auto Differential    Collection Time: 03/09/25  9:11 AM   Result Value Ref Range    WBC 8.4 4.6 - 13.2 K/uL    RBC 4.84 4.20 - 5.30 M/uL    Hemoglobin 13.9 12.0 - 16.0 g/dL    Hematocrit 44.0 35.0 - 45.0 %    MCV 90.9 78.0 - 100.0 FL    MCH 28.7 24.0 - 34.0 PG    MCHC 31.6 31.0 - 37.0 g/dL    RDW 13.8 11.6 - 14.5 %    Platelets 239 135 - 420 K/uL    MPV 10.6 9.2 - 11.8 FL    Nucleated RBCs 0.0 0  WBC    nRBC 0.00 0.00 - 0.01 K/uL    Neutrophils % 72.3 40.0 - 73.0 %    Lymphocytes % 17.4 (L) 21.0 - 52.0 %    Monocytes % 6.8 3.0 - 10.0 %    Eosinophils % 2.4 0.0 - 5.0 %    Basophils % 0.6 0.0 - 2.0 %    Immature Granulocytes % 0.5 0.0 - 0.5 %    Neutrophils Absolute 6.04 1.80 - 8.00 K/UL    Lymphocytes Absolute 1.45 0.90 - 3.30 K/UL    Monocytes Absolute 0.57 0.05 - 1.20 K/UL    Eosinophils Absolute 0.20 0.00 - 0.40 K/UL    Basophils Absolute 0.05 0.00 - 0.10 K/UL    Immature Granulocytes Absolute 0.04 0.00 - 0.04 K/UL    Differential Type AUTOMATED     POC Pregnancy Urine Qual    Collection Time: 03/09/25  9:56 AM   Result Value Ref Range    Preg Test, Ur Negative NEG     Urinalysis    Collection Time: 03/09/25  9:57 AM   Result Value Ref Range    Color, UA YELLOW      Appearance CLOUDY      Specific [place], UA 1.011 1.005 - 1.030      pH,

## 2025-03-09 NOTE — ED TRIAGE NOTES
Patient in ED via EMS for abdominal pain. Patient had a gastric sleeve surgery performed at [facility] on Thursday. Patient has pain at the incision site and on the right side of her back. Patient ran out of her pain meds.

## 2025-03-09 NOTE — FLOWSHEET NOTE
03/09/25 0816   Labs   Collection Site Left Hand   Number of Attempts 1   Date Collected 03/09/25   Time Collected 0814   Tube Color(s) Drawn Purple;Yellow

## 2025-03-09 NOTE — ED PROVIDER NOTES
[facility] EMERGENCY DEPT  EMERGENCY DEPARTMENT ENCOUNTER    Patient Name: Nikki Rose  MRN: [id number]  YOB: 1980  Provider: Dante Madden MD  PCP: Latonia Barker APRN - NP   Time/Date of evaluation: 7:04 AM EDT on 3/9/25    History of Presenting Illness     Chief Complaint   Patient presents with    Abdominal Pain       History Provided by: EMS and Patient   History is limited by: Nothing    HISTORY (Narative):   Nikki Rose is a 44 y.o. female with a PMHX of asthma, hypertension, morbid obesity, sleeve gastrectomy  who presents to the emergency department (room 13) by EMS C/O left-sided abdominal pain onset this morning. Associated sxs include nausea. Patient denies  or any other sxs or complaints.  Patient received gastrectomy done at [facility] on Thursday (3 days ago).  Patient states that she is out of her pain medicines.     Nursing Notes were all reviewed and agreed with or any disagreements were addressed in the HPI.    Past History     PAST MEDICAL HISTORY:  Past Medical History:   Diagnosis Date    Asthma     no inhaler    Endocrine disease     thyroid    GERD (gastroesophageal reflux disease)     Hypertension     Morbid obesity     Seizures (HCC)     isolated seizure at 3 years of age     Thyroid disease        PAST SURGICAL HISTORY:  No past surgical history on file.    FAMILY HISTORY:  No family history on file.    SOCIAL HISTORY:  Social History     Tobacco Use    Smoking status: Never    Smokeless tobacco: Never   Substance Use Topics    Alcohol use: Yes    Drug use: Not Currently     Types: Marijuana (Weed)       MEDICATIONS:  No current facility-administered medications for this encounter.     Current Outpatient Medications   Medication Sig Dispense Refill    dilTIAZem (TIAZAC) 180 MG extended release capsule Take 180 mg by mouth daily      levothyroxine (SYNTHROID) 100 MCG tablet Take 100 mcg by mouth every morning (before breakfast)      pantoprazole

## (undated) DEVICE — GARMENT,MEDLINE,DVT,INT,CALF,MED, GEN2: Brand: MEDLINE

## (undated) DEVICE — UNDERCAST PADDING: Brand: DEROYAL

## (undated) DEVICE — SUTURE ETHLN SZ 4-0 L18IN NONABSORBABLE BLK L19MM PS-2 3/8 1667H

## (undated) DEVICE — SUT MONOCRYL PLUS UD 3-0 --

## (undated) DEVICE — SOL IRRIGATION INJ NACL 0.9% 500ML BTL

## (undated) DEVICE — REM POLYHESIVE ADULT PATIENT RETURN ELECTRODE: Brand: VALLEYLAB

## (undated) DEVICE — BANDAGE COMPR W4INXL10YD WHITE/BEIGE E MTRX HK LOOP CLSR

## (undated) DEVICE — PACK PROCEDURE SURG EXTREMITY CUST

## (undated) DEVICE — PADDING CAST W4INXL4YD ST COT COHESIVE HND TEARABLE SPEC

## (undated) DEVICE — NEEDLE HYPO 25GA L1.5IN BLU POLYPR HUB S STL REG BVL STR

## (undated) DEVICE — SUT MONOCRYL PLUS UD 4-0 --

## (undated) DEVICE — SUT ETHLN 3-0 18IN PS2 BLK --

## (undated) DEVICE — CATH IV AUTOGRD ORN 14GA 45MM -- INSYTE-N

## (undated) DEVICE — SUTURE FIBERWIRE SZ 2 W/ TAPERED NEEDLE BLUE L38IN NONABSORB BLU L26.5MM 1/2 CIRCLE AR7200

## (undated) DEVICE — STRAP,POSITIONING,KNEE/BODY,FOAM,4X60": Brand: MEDLINE

## (undated) DEVICE — SUT ETHLN 5-0 18IN PC3 BLK --

## (undated) DEVICE — DRSG GZ OIL EMUL CURAD 3X8 --

## (undated) DEVICE — 3M™ STERI-STRIP™ REINFORCED ADHESIVE SKIN CLOSURES, R1542, 1/4 IN X 1-1/2 IN (6 MM X 38 MM), 6 STRIPS/ENVELOPE: Brand: 3M™ STERI-STRIP™

## (undated) DEVICE — SUTURE MCRYL SZ 3-0 L27IN ABSRB UD L26MM SH 1/2 CIR Y416H

## (undated) DEVICE — Device

## (undated) DEVICE — BNDG,ELSTC,MATRIX,STRL,6"X5YD,LF,HOOK&LP: Brand: MEDLINE

## (undated) DEVICE — ZIMMER® STERILE DISPOSABLE TOURNIQUET CUFF WITH PROTECTIVE SLEEVE AND PLC, SINGLE PORT, SINGLE BLADDER, 18 IN. (46 CM)

## (undated) DEVICE — PREP SKN CHLRAPRP APL 26ML STR --

## (undated) DEVICE — SPONGE GZ W4XL4IN COT 12 PLY TYP VII WVN C FLD DSGN

## (undated) DEVICE — SYR 10ML CTRL LR LCK NSAF LF --

## (undated) DEVICE — ZIMMER® STERILE DISPOSABLE TOURNIQUET CUFF WITH PROTECTIVE SLEEVE AND PLC, SINGLE PORT, SINGLE BLADDER, 34 IN. (86 CM)

## (undated) DEVICE — CURITY STRETCH BANDAGE: Brand: CURITY

## (undated) DEVICE — INTENDED FOR TISSUE SEPARATION, AND OTHER PROCEDURES THAT REQUIRE A SHARP SURGICAL BLADE TO PUNCTURE OR CUT.: Brand: BARD-PARKER SAFETY BLADES SIZE 15, STERILE

## (undated) DEVICE — DRESSING PETRO W3XL8IN N ADH OIL EMUL GZ CURAD

## (undated) DEVICE — BANDAGE,GAUZE,CONFORMING,2"X75",STRL,LF: Brand: MEDLINE INDUSTRIES, INC.

## (undated) DEVICE — SYR LR LCK 1ML GRAD NSAF 30ML --

## (undated) DEVICE — GOWN,AURORA,NONRNF,XL,30/CS: Brand: MEDLINE